# Patient Record
Sex: FEMALE | Race: WHITE | Employment: OTHER | URBAN - METROPOLITAN AREA
[De-identification: names, ages, dates, MRNs, and addresses within clinical notes are randomized per-mention and may not be internally consistent; named-entity substitution may affect disease eponyms.]

---

## 2019-03-11 ENCOUNTER — HOSPITAL ENCOUNTER (EMERGENCY)
Age: 74
Discharge: HOME | DRG: 552 | End: 2019-03-11
Attending: EMERGENCY MEDICINE
Payer: MEDICARE

## 2019-03-11 ENCOUNTER — APPOINTMENT (OUTPATIENT)
Dept: GENERAL RADIOLOGY | Age: 74
DRG: 552 | End: 2019-03-11
Payer: MEDICARE

## 2019-03-11 VITALS
HEIGHT: 60 IN | HEART RATE: 79 BPM | WEIGHT: 119 LBS | TEMPERATURE: 97.9 F | DIASTOLIC BLOOD PRESSURE: 97 MMHG | SYSTOLIC BLOOD PRESSURE: 247 MMHG | BODY MASS INDEX: 23.36 KG/M2 | OXYGEN SATURATION: 97 % | RESPIRATION RATE: 16 BRPM
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
## 2019-03-11 PROCEDURE — 72100 X-RAY EXAM L-S SPINE 2/3 VWS: CPT

## 2019-03-11 PROCEDURE — 99284 EMERGENCY DEPT VISIT MOD MDM: CPT

## 2019-03-11 RX ORDER — ACETAMINOPHEN 325 MG/1
650 TABLET ORAL ONCE
Status: DISCONTINUED | OUTPATIENT
Start: 2019-03-11 | End: 2019-03-11 | Stop reason: HOSPADM

## 2019-03-11 SDOH — HEALTH STABILITY: MENTAL HEALTH: HOW OFTEN DO YOU HAVE A DRINK CONTAINING ALCOHOL?: NEVER

## 2019-03-11 ASSESSMENT — ENCOUNTER SYMPTOMS
VOMITING: 0
WHEEZING: 0
COUGH: 0
ABDOMINAL PAIN: 0
BACK PAIN: 1
DIARRHEA: 0
NAUSEA: 0
EYE PAIN: 0
SHORTNESS OF BREATH: 0

## 2019-03-11 ASSESSMENT — PAIN SCALES - GENERAL: PAINLEVEL_OUTOF10: 8

## 2019-03-11 ASSESSMENT — PAIN DESCRIPTION - DESCRIPTORS: DESCRIPTORS: ACHING

## 2019-03-11 ASSESSMENT — PAIN DESCRIPTION - LOCATION: LOCATION: BUTTOCKS

## 2019-03-13 ENCOUNTER — HOSPITAL ENCOUNTER (INPATIENT)
Age: 74
LOS: 1 days | Discharge: HOME HEALTH | DRG: 552 | End: 2019-03-14
Attending: EMERGENCY MEDICINE | Admitting: INTERNAL MEDICINE
Payer: MEDICARE

## 2019-03-13 ENCOUNTER — APPOINTMENT (OUTPATIENT)
Dept: GENERAL RADIOLOGY | Age: 74
DRG: 552 | End: 2019-03-13
Payer: MEDICARE
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
## 2019-03-13 DIAGNOSIS — E86.0 DEHYDRATION: ICD-10-CM

## 2019-03-13 DIAGNOSIS — M54.9 INTRACTABLE BACK PAIN: ICD-10-CM

## 2019-03-13 LAB
ALBUMIN SERPL-MCNC: 3.9 G/DL (ref 3.4–5)
ALP BLD-CCNC: 96 U/L (ref 40–129)
ALT SERPL-CCNC: 7 U/L (ref 10–40)
ANION GAP SERPL CALCULATED.3IONS-SCNC: 24 MMOL/L (ref 3–16)
AST SERPL-CCNC: 22 U/L (ref 15–37)
BASOPHILS ABSOLUTE: 0 K/UL (ref 0–0.2)
BASOPHILS RELATIVE PERCENT: 0.4 %
BILIRUB SERPL-MCNC: 0.7 MG/DL (ref 0–1)
BILIRUBIN DIRECT: <0.2 MG/DL (ref 0–0.3)
BILIRUBIN URINE: ABNORMAL
BILIRUBIN, INDIRECT: ABNORMAL MG/DL (ref 0–1)
BLOOD, URINE: NEGATIVE
BUN BLDV-MCNC: 22 MG/DL (ref 7–20)
CALCIUM SERPL-MCNC: 9.1 MG/DL (ref 8.3–10.6)
CASTS: ABNORMAL /LPF
CHLORIDE BLD-SCNC: 98 MMOL/L (ref 99–110)
CLARITY: CLEAR
CO2: 18 MMOL/L (ref 21–32)
COLOR: YELLOW
CREAT SERPL-MCNC: 0.8 MG/DL (ref 0.6–1.2)
EKG ATRIAL RATE: 86 BPM
EKG DIAGNOSIS: NORMAL
EKG P AXIS: 43 DEGREES
EKG P-R INTERVAL: 140 MS
EKG Q-T INTERVAL: 386 MS
EKG QRS DURATION: 80 MS
EKG QTC CALCULATION (BAZETT): 461 MS
EKG R AXIS: -3 DEGREES
EKG T AXIS: 66 DEGREES
EKG VENTRICULAR RATE: 86 BPM
EOSINOPHILS ABSOLUTE: 0 K/UL (ref 0–0.6)
EOSINOPHILS RELATIVE PERCENT: 0.1 %
EPITHELIAL CELLS, UA: ABNORMAL /HPF
GFR AFRICAN AMERICAN: >60
GFR NON-AFRICAN AMERICAN: >60
GLUCOSE BLD-MCNC: 115 MG/DL (ref 70–99)
GLUCOSE URINE: NEGATIVE MG/DL
HCT VFR BLD CALC: 45.9 % (ref 36–48)
HEMOGLOBIN: 15.6 G/DL (ref 12–16)
KETONES, URINE: >=80 MG/DL
LACTIC ACID: 1.3 MMOL/L (ref 0.4–2)
LEUKOCYTE ESTERASE, URINE: NEGATIVE
LYMPHOCYTES ABSOLUTE: 0.3 K/UL (ref 1–5.1)
LYMPHOCYTES RELATIVE PERCENT: 4.9 %
MCH RBC QN AUTO: 30 PG (ref 26–34)
MCHC RBC AUTO-ENTMCNC: 34 G/DL (ref 31–36)
MCV RBC AUTO: 88.3 FL (ref 80–100)
MICROSCOPIC EXAMINATION: YES
MONOCYTES ABSOLUTE: 0.4 K/UL (ref 0–1.3)
MONOCYTES RELATIVE PERCENT: 5.8 %
MUCUS: ABNORMAL /LPF
NEUTROPHILS ABSOLUTE: 5.7 K/UL (ref 1.7–7.7)
NEUTROPHILS RELATIVE PERCENT: 88.8 %
NITRITE, URINE: NEGATIVE
PDW BLD-RTO: 13.7 % (ref 12.4–15.4)
PH UA: 6 (ref 5–8)
PLATELET # BLD: 224 K/UL (ref 135–450)
PMV BLD AUTO: 8.5 FL (ref 5–10.5)
POTASSIUM SERPL-SCNC: 3.7 MMOL/L (ref 3.5–5.1)
PROTEIN UA: 100 MG/DL
RBC # BLD: 5.2 M/UL (ref 4–5.2)
RBC UA: ABNORMAL /HPF (ref 0–2)
SODIUM BLD-SCNC: 140 MMOL/L (ref 136–145)
SPECIFIC GRAVITY UA: >=1.03 (ref 1–1.03)
TOTAL PROTEIN: 6.7 G/DL (ref 6.4–8.2)
TROPONIN: <0.01 NG/ML
URINE TYPE: ABNORMAL
UROBILINOGEN, URINE: 0.2 E.U./DL
WBC # BLD: 6.4 K/UL (ref 4–11)
WBC UA: ABNORMAL /HPF (ref 0–5)

## 2019-03-13 PROCEDURE — 93005 ELECTROCARDIOGRAM TRACING: CPT | Performed by: NURSE PRACTITIONER

## 2019-03-13 PROCEDURE — 96374 THER/PROPH/DIAG INJ IV PUSH: CPT

## 2019-03-13 PROCEDURE — 85025 COMPLETE CBC W/AUTO DIFF WBC: CPT

## 2019-03-13 PROCEDURE — 80048 BASIC METABOLIC PNL TOTAL CA: CPT

## 2019-03-13 PROCEDURE — 84484 ASSAY OF TROPONIN QUANT: CPT

## 2019-03-13 PROCEDURE — 2580000003 HC RX 258: Performed by: NURSE PRACTITIONER

## 2019-03-13 PROCEDURE — 96361 HYDRATE IV INFUSION ADD-ON: CPT

## 2019-03-13 PROCEDURE — 96365 THER/PROPH/DIAG IV INF INIT: CPT

## 2019-03-13 PROCEDURE — 83605 ASSAY OF LACTIC ACID: CPT

## 2019-03-13 PROCEDURE — 1200000000 HC SEMI PRIVATE

## 2019-03-13 PROCEDURE — 99285 EMERGENCY DEPT VISIT HI MDM: CPT

## 2019-03-13 PROCEDURE — 96375 TX/PRO/DX INJ NEW DRUG ADDON: CPT

## 2019-03-13 PROCEDURE — 6360000002 HC RX W HCPCS: Performed by: INTERNAL MEDICINE

## 2019-03-13 PROCEDURE — 81001 URINALYSIS AUTO W/SCOPE: CPT

## 2019-03-13 PROCEDURE — 2580000003 HC RX 258: Performed by: INTERNAL MEDICINE

## 2019-03-13 PROCEDURE — 80076 HEPATIC FUNCTION PANEL: CPT

## 2019-03-13 PROCEDURE — 73502 X-RAY EXAM HIP UNI 2-3 VIEWS: CPT

## 2019-03-13 PROCEDURE — 96372 THER/PROPH/DIAG INJ SC/IM: CPT

## 2019-03-13 PROCEDURE — 6370000000 HC RX 637 (ALT 250 FOR IP): Performed by: NURSE PRACTITIONER

## 2019-03-13 PROCEDURE — 6360000002 HC RX W HCPCS: Performed by: NURSE PRACTITIONER

## 2019-03-13 PROCEDURE — 6370000000 HC RX 637 (ALT 250 FOR IP): Performed by: INTERNAL MEDICINE

## 2019-03-13 PROCEDURE — 51798 US URINE CAPACITY MEASURE: CPT

## 2019-03-13 PROCEDURE — 36415 COLL VENOUS BLD VENIPUNCTURE: CPT

## 2019-03-13 RX ORDER — 0.9 % SODIUM CHLORIDE 0.9 %
500 INTRAVENOUS SOLUTION INTRAVENOUS ONCE
Status: COMPLETED | OUTPATIENT
Start: 2019-03-13 | End: 2019-03-13

## 2019-03-13 RX ORDER — SODIUM CHLORIDE 0.9 % (FLUSH) 0.9 %
10 SYRINGE (ML) INJECTION EVERY 12 HOURS SCHEDULED
Status: DISCONTINUED | OUTPATIENT
Start: 2019-03-13 | End: 2019-03-14 | Stop reason: HOSPADM

## 2019-03-13 RX ORDER — KETOROLAC TROMETHAMINE 30 MG/ML
15 INJECTION, SOLUTION INTRAMUSCULAR; INTRAVENOUS ONCE
Status: COMPLETED | OUTPATIENT
Start: 2019-03-13 | End: 2019-03-13

## 2019-03-13 RX ORDER — VITAMIN B COMPLEX
1 CAPSULE ORAL DAILY
COMMUNITY

## 2019-03-13 RX ORDER — DIAZEPAM 5 MG/1
5 TABLET ORAL ONCE
Status: DISCONTINUED | OUTPATIENT
Start: 2019-03-13 | End: 2019-03-13

## 2019-03-13 RX ORDER — ONDANSETRON 2 MG/ML
4 INJECTION INTRAMUSCULAR; INTRAVENOUS
Status: COMPLETED | OUTPATIENT
Start: 2019-03-13 | End: 2019-03-13

## 2019-03-13 RX ORDER — DEXTROSE AND SODIUM CHLORIDE 5; .9 G/100ML; G/100ML
1000 INJECTION, SOLUTION INTRAVENOUS CONTINUOUS
Status: DISCONTINUED | OUTPATIENT
Start: 2019-03-13 | End: 2019-03-14 | Stop reason: HOSPADM

## 2019-03-13 RX ORDER — ONDANSETRON 2 MG/ML
4 INJECTION INTRAMUSCULAR; INTRAVENOUS EVERY 6 HOURS PRN
Status: DISCONTINUED | OUTPATIENT
Start: 2019-03-13 | End: 2019-03-14 | Stop reason: HOSPADM

## 2019-03-13 RX ORDER — SODIUM CHLORIDE 0.9 % (FLUSH) 0.9 %
10 SYRINGE (ML) INJECTION PRN
Status: DISCONTINUED | OUTPATIENT
Start: 2019-03-13 | End: 2019-03-14 | Stop reason: HOSPADM

## 2019-03-13 RX ORDER — PREDNISONE 20 MG/1
40 TABLET ORAL ONCE
Status: COMPLETED | OUTPATIENT
Start: 2019-03-14 | End: 2019-03-14

## 2019-03-13 RX ORDER — AMLODIPINE BESYLATE 10 MG/1
10 TABLET ORAL DAILY
Status: DISCONTINUED | OUTPATIENT
Start: 2019-03-14 | End: 2019-03-14 | Stop reason: HOSPADM

## 2019-03-13 RX ORDER — OXYCODONE HYDROCHLORIDE AND ACETAMINOPHEN 5; 325 MG/1; MG/1
1 TABLET ORAL EVERY 4 HOURS PRN
Status: DISCONTINUED | OUTPATIENT
Start: 2019-03-13 | End: 2019-03-14 | Stop reason: HOSPADM

## 2019-03-13 RX ORDER — AMLODIPINE BESYLATE 5 MG/1
5 TABLET ORAL DAILY
Status: ON HOLD | COMMUNITY
End: 2019-03-14 | Stop reason: SDUPTHER

## 2019-03-13 RX ORDER — AMLODIPINE BESYLATE 5 MG/1
5 TABLET ORAL ONCE
Status: COMPLETED | OUTPATIENT
Start: 2019-03-13 | End: 2019-03-13

## 2019-03-13 RX ORDER — HYDRALAZINE HYDROCHLORIDE 10 MG/1
10 TABLET, FILM COATED ORAL EVERY 8 HOURS SCHEDULED
Status: DISCONTINUED | OUTPATIENT
Start: 2019-03-13 | End: 2019-03-14 | Stop reason: HOSPADM

## 2019-03-13 RX ORDER — OXYCODONE HYDROCHLORIDE AND ACETAMINOPHEN 5; 325 MG/1; MG/1
2 TABLET ORAL EVERY 4 HOURS PRN
Status: DISCONTINUED | OUTPATIENT
Start: 2019-03-13 | End: 2019-03-14 | Stop reason: HOSPADM

## 2019-03-13 RX ORDER — HYDRALAZINE HYDROCHLORIDE 20 MG/ML
10 INJECTION INTRAMUSCULAR; INTRAVENOUS EVERY 6 HOURS PRN
Status: DISCONTINUED | OUTPATIENT
Start: 2019-03-13 | End: 2019-03-14 | Stop reason: HOSPADM

## 2019-03-13 RX ORDER — MULTIVIT-MIN/IRON/FOLIC ACID/K 18-600-40
2000 CAPSULE ORAL DAILY
COMMUNITY

## 2019-03-13 RX ORDER — RISPERIDONE 0.25 MG/1
0.25 TABLET, FILM COATED ORAL DAILY
Status: ON HOLD | COMMUNITY
End: 2019-03-16

## 2019-03-13 RX ORDER — DIAZEPAM 5 MG/1
5 TABLET ORAL ONCE
Status: COMPLETED | OUTPATIENT
Start: 2019-03-13 | End: 2019-03-13

## 2019-03-13 RX ORDER — PREDNISONE 20 MG/1
40 TABLET ORAL ONCE
Status: COMPLETED | OUTPATIENT
Start: 2019-03-13 | End: 2019-03-13

## 2019-03-13 RX ORDER — AMLODIPINE BESYLATE 5 MG/1
5 TABLET ORAL DAILY
Status: DISCONTINUED | OUTPATIENT
Start: 2019-03-13 | End: 2019-03-13

## 2019-03-13 RX ADMIN — HYDRALAZINE HYDROCHLORIDE 10 MG: 10 TABLET, FILM COATED ORAL at 15:47

## 2019-03-13 RX ADMIN — AMLODIPINE BESYLATE 5 MG: 5 TABLET ORAL at 17:51

## 2019-03-13 RX ADMIN — KETOROLAC TROMETHAMINE 15 MG: 30 INJECTION, SOLUTION INTRAMUSCULAR; INTRAVENOUS at 15:28

## 2019-03-13 RX ADMIN — PREDNISONE 40 MG: 20 TABLET ORAL at 17:51

## 2019-03-13 RX ADMIN — HYDRALAZINE HYDROCHLORIDE 10 MG: 10 TABLET, FILM COATED ORAL at 20:41

## 2019-03-13 RX ADMIN — AMLODIPINE BESYLATE 5 MG: 5 TABLET ORAL at 15:47

## 2019-03-13 RX ADMIN — METHOCARBAMOL 500 MG: 100 INJECTION, SOLUTION INTRAMUSCULAR; INTRAVENOUS at 17:50

## 2019-03-13 RX ADMIN — DEXTROSE AND SODIUM CHLORIDE 1000 ML: 5; 900 INJECTION, SOLUTION INTRAVENOUS at 14:22

## 2019-03-13 RX ADMIN — SODIUM CHLORIDE 500 ML: 9 INJECTION, SOLUTION INTRAVENOUS at 11:32

## 2019-03-13 RX ADMIN — ONDANSETRON 4 MG: 2 INJECTION INTRAMUSCULAR; INTRAVENOUS at 11:32

## 2019-03-13 RX ADMIN — Medication 10 ML: at 17:52

## 2019-03-13 RX ADMIN — DIAZEPAM 5 MG: 5 TABLET ORAL at 11:32

## 2019-03-13 RX ADMIN — ENOXAPARIN SODIUM 40 MG: 40 INJECTION SUBCUTANEOUS at 17:52

## 2019-03-13 ASSESSMENT — ENCOUNTER SYMPTOMS
BACK PAIN: 1
GASTROINTESTINAL NEGATIVE: 1
RESPIRATORY NEGATIVE: 1

## 2019-03-13 ASSESSMENT — PAIN DESCRIPTION - PAIN TYPE: TYPE: ACUTE PAIN

## 2019-03-13 ASSESSMENT — PAIN SCALES - GENERAL
PAINLEVEL_OUTOF10: 4
PAINLEVEL_OUTOF10: 0
PAINLEVEL_OUTOF10: 0

## 2019-03-13 ASSESSMENT — PAIN DESCRIPTION - LOCATION: LOCATION: BACK

## 2019-03-13 ASSESSMENT — PAIN DESCRIPTION - ORIENTATION: ORIENTATION: LOWER

## 2019-03-14 ENCOUNTER — APPOINTMENT (OUTPATIENT)
Dept: GENERAL RADIOLOGY | Age: 74
DRG: 305 | End: 2019-03-14
Payer: MEDICARE

## 2019-03-14 ENCOUNTER — HOSPITAL ENCOUNTER (EMERGENCY)
Age: 74
Discharge: HOME | DRG: 305 | End: 2019-03-15
Attending: EMERGENCY MEDICINE
Payer: MEDICARE

## 2019-03-14 VITALS
SYSTOLIC BLOOD PRESSURE: 142 MMHG | HEART RATE: 119 BPM | DIASTOLIC BLOOD PRESSURE: 72 MMHG | TEMPERATURE: 97.9 F | OXYGEN SATURATION: 94 % | WEIGHT: 105.82 LBS | HEIGHT: 60 IN | BODY MASS INDEX: 20.78 KG/M2 | RESPIRATION RATE: 15 BRPM

## 2019-03-14 DIAGNOSIS — M25.552 BILATERAL HIP PAIN: Primary | ICD-10-CM

## 2019-03-14 DIAGNOSIS — M25.551 BILATERAL HIP PAIN: Primary | ICD-10-CM

## 2019-03-14 PROBLEM — I10 UNCONTROLLED HYPERTENSION: Status: ACTIVE | Noted: 2019-03-14

## 2019-03-14 PROBLEM — M54.50 LOWER BACK PAIN: Status: ACTIVE | Noted: 2019-03-14

## 2019-03-14 PROBLEM — Z91.14 NON COMPLIANCE W MEDICATION REGIMEN: Status: ACTIVE | Noted: 2019-03-14

## 2019-03-14 PROBLEM — E55.9 VITAMIN D DEFICIENCY: Status: ACTIVE | Noted: 2019-03-14

## 2019-03-14 PROBLEM — M54.9 ACUTE BACK PAIN: Status: ACTIVE | Noted: 2019-03-14

## 2019-03-14 PROBLEM — M62.830 PARASPINAL MUSCLE SPASM: Status: ACTIVE | Noted: 2019-03-14

## 2019-03-14 LAB
ANION GAP SERPL CALCULATED.3IONS-SCNC: 11 MMOL/L (ref 3–16)
BASOPHILS ABSOLUTE: 0 K/UL (ref 0–0.2)
BASOPHILS RELATIVE PERCENT: 0.2 %
BUN BLDV-MCNC: 17 MG/DL (ref 7–20)
CALCIUM SERPL-MCNC: 9 MG/DL (ref 8.3–10.6)
CHLORIDE BLD-SCNC: 106 MMOL/L (ref 99–110)
CO2: 22 MMOL/L (ref 21–32)
CREAT SERPL-MCNC: <0.5 MG/DL (ref 0.6–1.2)
EOSINOPHILS ABSOLUTE: 0 K/UL (ref 0–0.6)
EOSINOPHILS RELATIVE PERCENT: 0 %
GFR AFRICAN AMERICAN: >60
GFR NON-AFRICAN AMERICAN: >60
GLUCOSE BLD-MCNC: 175 MG/DL (ref 70–99)
HCT VFR BLD CALC: 40.7 % (ref 36–48)
HEMOGLOBIN: 13.6 G/DL (ref 12–16)
LYMPHOCYTES ABSOLUTE: 0.2 K/UL (ref 1–5.1)
LYMPHOCYTES RELATIVE PERCENT: 5.5 %
MCH RBC QN AUTO: 29.6 PG (ref 26–34)
MCHC RBC AUTO-ENTMCNC: 33.5 G/DL (ref 31–36)
MCV RBC AUTO: 88.3 FL (ref 80–100)
MONOCYTES ABSOLUTE: 0.3 K/UL (ref 0–1.3)
MONOCYTES RELATIVE PERCENT: 8.1 %
NEUTROPHILS ABSOLUTE: 3.6 K/UL (ref 1.7–7.7)
NEUTROPHILS RELATIVE PERCENT: 86.2 %
PDW BLD-RTO: 13.5 % (ref 12.4–15.4)
PLATELET # BLD: 184 K/UL (ref 135–450)
PMV BLD AUTO: 8.5 FL (ref 5–10.5)
POTASSIUM REFLEX MAGNESIUM: 3.7 MMOL/L (ref 3.5–5.1)
RBC # BLD: 4.61 M/UL (ref 4–5.2)
SODIUM BLD-SCNC: 139 MMOL/L (ref 136–145)
VITAMIN D 25-HYDROXY: 9.8 NG/ML
WBC # BLD: 4.2 K/UL (ref 4–11)

## 2019-03-14 PROCEDURE — 6370000000 HC RX 637 (ALT 250 FOR IP): Performed by: EMERGENCY MEDICINE

## 2019-03-14 PROCEDURE — 97530 THERAPEUTIC ACTIVITIES: CPT

## 2019-03-14 PROCEDURE — 6370000000 HC RX 637 (ALT 250 FOR IP): Performed by: INTERNAL MEDICINE

## 2019-03-14 PROCEDURE — 51798 US URINE CAPACITY MEASURE: CPT

## 2019-03-14 PROCEDURE — 96366 THER/PROPH/DIAG IV INF ADDON: CPT

## 2019-03-14 PROCEDURE — 36415 COLL VENOUS BLD VENIPUNCTURE: CPT

## 2019-03-14 PROCEDURE — 2580000003 HC RX 258: Performed by: INTERNAL MEDICINE

## 2019-03-14 PROCEDURE — 96376 TX/PRO/DX INJ SAME DRUG ADON: CPT

## 2019-03-14 PROCEDURE — 97162 PT EVAL MOD COMPLEX 30 MIN: CPT

## 2019-03-14 PROCEDURE — 6360000002 HC RX W HCPCS: Performed by: INTERNAL MEDICINE

## 2019-03-14 PROCEDURE — 82306 VITAMIN D 25 HYDROXY: CPT

## 2019-03-14 PROCEDURE — 80048 BASIC METABOLIC PNL TOTAL CA: CPT

## 2019-03-14 PROCEDURE — 99283 EMERGENCY DEPT VISIT LOW MDM: CPT

## 2019-03-14 PROCEDURE — 97535 SELF CARE MNGMENT TRAINING: CPT

## 2019-03-14 PROCEDURE — 97116 GAIT TRAINING THERAPY: CPT

## 2019-03-14 PROCEDURE — 97165 OT EVAL LOW COMPLEX 30 MIN: CPT

## 2019-03-14 PROCEDURE — 96375 TX/PRO/DX INJ NEW DRUG ADDON: CPT

## 2019-03-14 PROCEDURE — 73522 X-RAY EXAM HIPS BI 3-4 VIEWS: CPT

## 2019-03-14 PROCEDURE — 85025 COMPLETE CBC W/AUTO DIFF WBC: CPT

## 2019-03-14 RX ORDER — HYDRALAZINE HYDROCHLORIDE 10 MG/1
10 TABLET, FILM COATED ORAL EVERY 8 HOURS SCHEDULED
Qty: 90 TABLET | Refills: 3 | Status: SHIPPED | OUTPATIENT
Start: 2019-03-14 | End: 2019-03-14

## 2019-03-14 RX ORDER — AMLODIPINE BESYLATE 10 MG/1
10 TABLET ORAL DAILY
Qty: 30 TABLET | Refills: 3 | Status: SHIPPED | OUTPATIENT
Start: 2019-03-14

## 2019-03-14 RX ORDER — AMLODIPINE BESYLATE 10 MG/1
10 TABLET ORAL DAILY
Qty: 30 TABLET | Refills: 1 | Status: SHIPPED | OUTPATIENT
Start: 2019-03-14 | End: 2019-03-14 | Stop reason: SDUPTHER

## 2019-03-14 RX ORDER — ERGOCALCIFEROL 1.25 MG/1
50000 CAPSULE ORAL WEEKLY
Qty: 12 CAPSULE | Refills: 0 | Status: SHIPPED | OUTPATIENT
Start: 2019-03-14 | End: 2019-06-12

## 2019-03-14 RX ORDER — PREDNISONE 20 MG/1
20 TABLET ORAL 2 TIMES DAILY
Qty: 10 TABLET | Refills: 0 | Status: ON HOLD | OUTPATIENT
Start: 2019-03-14 | End: 2019-03-19 | Stop reason: HOSPADM

## 2019-03-14 RX ORDER — METHOCARBAMOL 500 MG/1
500 TABLET, FILM COATED ORAL 3 TIMES DAILY
Qty: 15 TABLET | Refills: 0 | Status: SHIPPED | OUTPATIENT
Start: 2019-03-14 | End: 2019-03-19

## 2019-03-14 RX ORDER — HYDRALAZINE HYDROCHLORIDE 10 MG/1
10 TABLET, FILM COATED ORAL EVERY 8 HOURS SCHEDULED
Qty: 90 TABLET | Refills: 3 | Status: ON HOLD | OUTPATIENT
Start: 2019-03-14 | End: 2019-03-19 | Stop reason: HOSPADM

## 2019-03-14 RX ORDER — ACETAMINOPHEN 500 MG
1000 TABLET ORAL ONCE
Status: COMPLETED | OUTPATIENT
Start: 2019-03-14 | End: 2019-03-14

## 2019-03-14 RX ORDER — METHOCARBAMOL 500 MG/1
500 TABLET, FILM COATED ORAL 3 TIMES DAILY
Qty: 15 TABLET | Refills: 0 | Status: SHIPPED | OUTPATIENT
Start: 2019-03-14 | End: 2019-03-14 | Stop reason: SDUPTHER

## 2019-03-14 RX ADMIN — HYDRALAZINE HYDROCHLORIDE 10 MG: 10 TABLET, FILM COATED ORAL at 05:51

## 2019-03-14 RX ADMIN — DEXTROSE AND SODIUM CHLORIDE 1000 ML: 5; 900 INJECTION, SOLUTION INTRAVENOUS at 08:09

## 2019-03-14 RX ADMIN — METHOCARBAMOL 500 MG: 100 INJECTION, SOLUTION INTRAMUSCULAR; INTRAVENOUS at 01:15

## 2019-03-14 RX ADMIN — PREDNISONE 40 MG: 20 TABLET ORAL at 08:37

## 2019-03-14 RX ADMIN — ENOXAPARIN SODIUM 40 MG: 40 INJECTION SUBCUTANEOUS at 08:37

## 2019-03-14 RX ADMIN — Medication 10 ML: at 08:38

## 2019-03-14 RX ADMIN — METHOCARBAMOL 500 MG: 100 INJECTION, SOLUTION INTRAMUSCULAR; INTRAVENOUS at 14:18

## 2019-03-14 RX ADMIN — ACETAMINOPHEN 1000 MG: 500 TABLET, FILM COATED ORAL at 23:31

## 2019-03-14 RX ADMIN — METHOCARBAMOL 500 MG: 100 INJECTION, SOLUTION INTRAMUSCULAR; INTRAVENOUS at 09:45

## 2019-03-14 RX ADMIN — HYDRALAZINE HYDROCHLORIDE 10 MG: 20 INJECTION INTRAMUSCULAR; INTRAVENOUS at 08:38

## 2019-03-14 RX ADMIN — AMLODIPINE BESYLATE 10 MG: 10 TABLET ORAL at 08:38

## 2019-03-14 RX ADMIN — Medication 10 ML: at 01:15

## 2019-03-14 RX ADMIN — HYDRALAZINE HYDROCHLORIDE 10 MG: 10 TABLET, FILM COATED ORAL at 14:18

## 2019-03-14 ASSESSMENT — PAIN DESCRIPTION - LOCATION: LOCATION: HIP

## 2019-03-14 ASSESSMENT — PAIN SCALES - GENERAL
PAINLEVEL_OUTOF10: 5
PAINLEVEL_OUTOF10: 5
PAINLEVEL_OUTOF10: 0

## 2019-03-14 ASSESSMENT — PAIN DESCRIPTION - DESCRIPTORS: DESCRIPTORS: ACHING

## 2019-03-14 ASSESSMENT — PAIN DESCRIPTION - ORIENTATION: ORIENTATION: LEFT

## 2019-03-14 ASSESSMENT — PAIN DESCRIPTION - PAIN TYPE: TYPE: ACUTE PAIN

## 2019-03-15 ENCOUNTER — APPOINTMENT (OUTPATIENT)
Dept: MRI IMAGING | Age: 74
DRG: 305 | End: 2019-03-15
Payer: MEDICARE

## 2019-03-15 ENCOUNTER — HOSPITAL ENCOUNTER (INPATIENT)
Age: 74
LOS: 3 days | Discharge: NURSING, REHAB OR LONG-TERM CARE | DRG: 305 | End: 2019-03-19
Attending: EMERGENCY MEDICINE | Admitting: INTERNAL MEDICINE
Payer: MEDICARE

## 2019-03-15 VITALS
HEART RATE: 84 BPM | RESPIRATION RATE: 16 BRPM | OXYGEN SATURATION: 99 % | TEMPERATURE: 98.2 F | WEIGHT: 105 LBS | SYSTOLIC BLOOD PRESSURE: 168 MMHG | BODY MASS INDEX: 20.51 KG/M2 | DIASTOLIC BLOOD PRESSURE: 68 MMHG

## 2019-03-15 DIAGNOSIS — R41.0 DELIRIUM: Primary | ICD-10-CM

## 2019-03-15 DIAGNOSIS — M54.50 ACUTE MIDLINE LOW BACK PAIN WITHOUT SCIATICA: ICD-10-CM

## 2019-03-15 LAB
ANION GAP SERPL CALCULATED.3IONS-SCNC: 13 MMOL/L (ref 3–16)
BACTERIA: ABNORMAL /HPF
BASOPHILS ABSOLUTE: 0 K/UL (ref 0–0.2)
BASOPHILS RELATIVE PERCENT: 0.8 %
BILIRUBIN URINE: NEGATIVE
BLOOD, URINE: ABNORMAL
BUN BLDV-MCNC: 12 MG/DL (ref 7–20)
CALCIUM SERPL-MCNC: 9.4 MG/DL (ref 8.3–10.6)
CHLORIDE BLD-SCNC: 99 MMOL/L (ref 99–110)
CLARITY: CLEAR
CO2: 29 MMOL/L (ref 21–32)
COLOR: YELLOW
CREAT SERPL-MCNC: 0.6 MG/DL (ref 0.6–1.2)
EOSINOPHILS ABSOLUTE: 0 K/UL (ref 0–0.6)
EOSINOPHILS RELATIVE PERCENT: 0.5 %
EPITHELIAL CELLS, UA: ABNORMAL /HPF
GFR AFRICAN AMERICAN: >60
GFR NON-AFRICAN AMERICAN: >60
GLUCOSE BLD-MCNC: 117 MG/DL (ref 70–99)
GLUCOSE URINE: NEGATIVE MG/DL
HCT VFR BLD CALC: 41.7 % (ref 36–48)
HEMOGLOBIN: 14.1 G/DL (ref 12–16)
KETONES, URINE: NEGATIVE MG/DL
LEUKOCYTE ESTERASE, URINE: NEGATIVE
LYMPHOCYTES ABSOLUTE: 0.7 K/UL (ref 1–5.1)
LYMPHOCYTES RELATIVE PERCENT: 11.5 %
MAGNESIUM: 1.8 MG/DL (ref 1.8–2.4)
MCH RBC QN AUTO: 29.7 PG (ref 26–34)
MCHC RBC AUTO-ENTMCNC: 33.9 G/DL (ref 31–36)
MCV RBC AUTO: 87.5 FL (ref 80–100)
MICROSCOPIC EXAMINATION: YES
MONOCYTES ABSOLUTE: 0.6 K/UL (ref 0–1.3)
MONOCYTES RELATIVE PERCENT: 8.9 %
NEUTROPHILS ABSOLUTE: 5 K/UL (ref 1.7–7.7)
NEUTROPHILS RELATIVE PERCENT: 78.3 %
NITRITE, URINE: NEGATIVE
PDW BLD-RTO: 14.3 % (ref 12.4–15.4)
PH UA: 7 (ref 5–8)
PLATELET # BLD: 248 K/UL (ref 135–450)
PMV BLD AUTO: 8.6 FL (ref 5–10.5)
POTASSIUM SERPL-SCNC: 3.2 MMOL/L (ref 3.5–5.1)
PROTEIN UA: NEGATIVE MG/DL
RBC # BLD: 4.77 M/UL (ref 4–5.2)
RBC UA: ABNORMAL /HPF (ref 0–2)
SODIUM BLD-SCNC: 141 MMOL/L (ref 136–145)
SPECIFIC GRAVITY UA: 1.01 (ref 1–1.03)
URINE REFLEX TO CULTURE: ABNORMAL
URINE TYPE: ABNORMAL
UROBILINOGEN, URINE: 0.2 E.U./DL
WBC # BLD: 6.4 K/UL (ref 4–11)
WBC UA: ABNORMAL /HPF (ref 0–5)

## 2019-03-15 PROCEDURE — 83735 ASSAY OF MAGNESIUM: CPT

## 2019-03-15 PROCEDURE — 96365 THER/PROPH/DIAG IV INF INIT: CPT

## 2019-03-15 PROCEDURE — 96375 TX/PRO/DX INJ NEW DRUG ADDON: CPT

## 2019-03-15 PROCEDURE — 81001 URINALYSIS AUTO W/SCOPE: CPT

## 2019-03-15 PROCEDURE — 72148 MRI LUMBAR SPINE W/O DYE: CPT

## 2019-03-15 PROCEDURE — 96361 HYDRATE IV INFUSION ADD-ON: CPT

## 2019-03-15 PROCEDURE — 6370000000 HC RX 637 (ALT 250 FOR IP): Performed by: EMERGENCY MEDICINE

## 2019-03-15 PROCEDURE — 85025 COMPLETE CBC W/AUTO DIFF WBC: CPT

## 2019-03-15 PROCEDURE — 80048 BASIC METABOLIC PNL TOTAL CA: CPT

## 2019-03-15 PROCEDURE — 6360000002 HC RX W HCPCS: Performed by: FAMILY MEDICINE

## 2019-03-15 PROCEDURE — 6360000002 HC RX W HCPCS: Performed by: EMERGENCY MEDICINE

## 2019-03-15 PROCEDURE — 99285 EMERGENCY DEPT VISIT HI MDM: CPT

## 2019-03-15 PROCEDURE — 36415 COLL VENOUS BLD VENIPUNCTURE: CPT

## 2019-03-15 PROCEDURE — 2580000003 HC RX 258: Performed by: EMERGENCY MEDICINE

## 2019-03-15 RX ORDER — HYDRALAZINE HYDROCHLORIDE 20 MG/ML
10 INJECTION INTRAMUSCULAR; INTRAVENOUS ONCE
Status: COMPLETED | OUTPATIENT
Start: 2019-03-15 | End: 2019-03-15

## 2019-03-15 RX ORDER — 0.9 % SODIUM CHLORIDE 0.9 %
1000 INTRAVENOUS SOLUTION INTRAVENOUS ONCE
Status: COMPLETED | OUTPATIENT
Start: 2019-03-15 | End: 2019-03-15

## 2019-03-15 RX ORDER — HYDRALAZINE HYDROCHLORIDE 10 MG/1
10 TABLET, FILM COATED ORAL ONCE
Status: COMPLETED | OUTPATIENT
Start: 2019-03-15 | End: 2019-03-15

## 2019-03-15 RX ORDER — POTASSIUM CHLORIDE 1.5 G/1.77G
60 POWDER, FOR SOLUTION ORAL ONCE
Status: COMPLETED | OUTPATIENT
Start: 2019-03-15 | End: 2019-03-15

## 2019-03-15 RX ORDER — AMLODIPINE BESYLATE 10 MG/1
10 TABLET ORAL ONCE
Status: COMPLETED | OUTPATIENT
Start: 2019-03-15 | End: 2019-03-15

## 2019-03-15 RX ORDER — AMLODIPINE BESYLATE 10 MG/1
10 TABLET ORAL DAILY
Status: DISCONTINUED | OUTPATIENT
Start: 2019-03-16 | End: 2019-03-15

## 2019-03-15 RX ORDER — MAGNESIUM SULFATE 1 G/100ML
1 INJECTION INTRAVENOUS ONCE
Status: COMPLETED | OUTPATIENT
Start: 2019-03-15 | End: 2019-03-15

## 2019-03-15 RX ADMIN — SODIUM CHLORIDE 1000 ML: 0.9 INJECTION, SOLUTION INTRAVENOUS at 18:56

## 2019-03-15 RX ADMIN — HYDRALAZINE HYDROCHLORIDE 10 MG: 20 INJECTION, SOLUTION INTRAMUSCULAR; INTRAVENOUS at 22:43

## 2019-03-15 RX ADMIN — HYDRALAZINE HYDROCHLORIDE 10 MG: 10 TABLET, FILM COATED ORAL at 21:40

## 2019-03-15 RX ADMIN — POTASSIUM CHLORIDE 60 MEQ: 1.5 POWDER, FOR SOLUTION ORAL at 20:06

## 2019-03-15 RX ADMIN — AMLODIPINE BESYLATE 10 MG: 10 TABLET ORAL at 21:40

## 2019-03-15 RX ADMIN — MAGNESIUM SULFATE HEPTAHYDRATE 1 G: 1 INJECTION, SOLUTION INTRAVENOUS at 20:56

## 2019-03-15 ASSESSMENT — ENCOUNTER SYMPTOMS
GASTROINTESTINAL NEGATIVE: 1
COUGH: 0
RESPIRATORY NEGATIVE: 1
ABDOMINAL PAIN: 0
BACK PAIN: 1
RHINORRHEA: 0
DIARRHEA: 0
EYES NEGATIVE: 1
SHORTNESS OF BREATH: 0
NAUSEA: 0
BACK PAIN: 1
VOMITING: 0

## 2019-03-15 ASSESSMENT — PAIN DESCRIPTION - FREQUENCY: FREQUENCY: CONTINUOUS

## 2019-03-15 ASSESSMENT — PAIN DESCRIPTION - DESCRIPTORS: DESCRIPTORS: ACHING

## 2019-03-15 ASSESSMENT — PAIN DESCRIPTION - PAIN TYPE: TYPE: ACUTE PAIN

## 2019-03-15 ASSESSMENT — PAIN DESCRIPTION - ORIENTATION: ORIENTATION: LOWER

## 2019-03-15 ASSESSMENT — PAIN SCALES - GENERAL: PAINLEVEL_OUTOF10: 1

## 2019-03-15 NOTE — ED PROVIDER NOTES
[address] [facility]          ATTENDING PHYSICIAN NOTE       Date of evaluation: 3/14/2019    Chief Complaint     Hip Pain (left hip pain/denies any new falls)      History of Present Illness     Jer Bella is a 68 y.o. female who presents to the emergency department for hip pain. Patient had a fall approximately 4 days ago and was seen initially in the emergency department for back pain and discharged home but then returned because of inability to get out of the bed. She was admitted to the hospital and had evaluation by physical myofascial therapy and was cleared to go home with 24-hour supervision. Patient states she started having pain now in her hips and intsead of the pain in her back. She states she went fire department and they recommended she go to the hospital for evaluation. She states the pain is worse the left side but is present on both sides. She denies any new trauma. She has been taking the medications prescribed for her which include a muscle relaxant and steroids but has not tried any other medications for pain. Review of Systems     Review of Systems   Constitutional: Negative. HENT: Negative. Eyes: Negative. Respiratory: Negative. Cardiovascular: Negative. Gastrointestinal: Negative. Genitourinary: Negative. Musculoskeletal: Positive for back pain and myalgias. Neurological: Negative. All other systems reviewed and are negative. Past Medical, Surgical, Family, and Social History     She has a past medical history of Hyperlipidemia, Hypertension, Hypothyroidism, Osteoporosis, Patient noncompliance, Renal colic, and Vascular dementia. She has a past surgical history that includes  section. Her family history is not on file. She reports that she has never smoked. She has never used smokeless tobacco. She reports that she does not drink alcohol or use drugs.     Medications     Discharge Medication List as of 3/15/2019  1:15 AM      CONTINUE these medications which have NOT CHANGED    Details   vitamin D (ERGOCALCIFEROL) 89835 units CAPS capsule Take 1 capsule by mouth once a week, Disp-12 capsule, R-0Normal      hydrALAZINE (APRESOLINE) 10 MG tablet Take 1 tablet by mouth every 8 hours, Disp-90 tablet, R-3Normal      amLODIPine (NORVASC) 10 MG tablet Take 1 tablet by mouth daily, Disp-30 tablet, R-3Normal      methocarbamol (ROBAXIN) 500 MG tablet Take 1 tablet by mouth 3 times daily for 5 days, Disp-15 tablet, R-0Normal      predniSONE (DELTASONE) 20 MG tablet Take 1 tablet by mouth 2 times daily for 5 days, Disp-10 tablet, R-0Normal      risperiDONE (RISPERDAL) 0.25 MG tablet Take 0.25 mg by mouth dailyHistorical Med      Cholecalciferol (VITAMIN D) 2000 units CAPS capsule Take 2,000 capsules by mouth dailyHistorical Med      b complex vitamins capsule Take 1 capsule by mouth dailyHistorical Med      LACTOBACILLUS ACID-PECTIN PO Take by mouth No dose on bottleHistorical Med      NONFORMULARY Intolerance complex unsure doseHistorical Med             Allergies     She is allergic to codeine and zithromax [azithromycin]. Physical Exam     INITIAL VITALS: BP: (!) 185/84, Temp: 98.2 °F (36.8 °C), Pulse: 82, Resp: 16, SpO2: 96 %    Physical Exam   Constitutional: She appears well-developed and well-nourished. No distress. Cardiovascular: Intact distal pulses. Abdominal: Soft. There is no tenderness. Musculoskeletal:        Right hip: She exhibits tenderness and bony tenderness. Left hip: She exhibits tenderness and bony tenderness. Lumbar back: She exhibits tenderness. She exhibits no bony tenderness. Paraspinal tenderness to the lumbar spine with no midline tenderness or step is. Patient does have tenderness over the greater trochanters bilaterally with no tenderness on logroll.    Neurological:   Patient has decreased range of motion secondary pain but is able to fully range at her ankles bilaterally. Sensation is intact distally. Nursing note and vitals reviewed. Diagnostic Results     RADIOLOGY:  XR HIP 3-4 VW W PELVIS BILATERAL   Final Result     No acute fracture or dislocation. RECENT VITALS:  BP: (!) 168/68,Temp: 98.2 °F (36.8 °C), Pulse: 82, Resp: (!) 82, SpO2: 99 %     Procedures     N/A    ED Course     Nursing Notes, Past Medical Hx, Past Surgical Hx, Social Hx,Allergies, and Family Hx were reviewed. The patient was given the following medications:  Orders Placed This Encounter   Medications    acetaminophen (TYLENOL) tablet 1,000 mg    Misc. Devices (WALKER) MISC     Si each by Does not apply route daily     Dispense:  1 each     Refill:  0       CONSULTS:  None    MEDICAL DECISIONMAKING / ASSESSMENT / Connie Lynda is a 68 y.o. female presents to the emergency department complaining of hip pain. Patient had a fall several days ago and has been seen twice in the emergency department and once admitted to the hospital as been cleared by physical knot patient therapy. Patient has have tenderness to palpation to the bilateral hips though no tenderness on logroll. X-rays to the street no osseous abnormalities. Patient was given Tylenol with some improvement of her symptoms. Patient was able to ambulate using a walker. Family was very concerned of patient's ability to care for herself at home but had a long discussion stating that there is no clear indication for admission to the hospital.  Patient will be discharged with instructions to follow-up with her primary care provider. Clinical Impression     1. Bilateral hip pain        Disposition     PATIENT REFERRED TO:  Tanya Frankel MD            DISCHARGE MEDICATIONS:  Discharge Medication List as of 3/15/2019  1:15 AM      START taking these medications    Details   Misc.  Devices [facility]) MISC DAILY Starting Fri 3/15/2019, Disp-1 each, R-0, Print             DISPOSITION          Iver Barefoot

## 2019-03-15 NOTE — ED PROVIDER NOTES
[address] [facility]          ATTENDING PHYSICIAN NOTE     Date of evaluation: 3/15/2019    Chief Complaint     Back Pain      History of Present Illness     Maria Esther Gooden is a 68 y.o. female with a history of HTN, HL, and osteoporosis who presents with confusion, back pain, and urinary incontinence. Seen in ED 3/11 after fall. Seen again 3/13 and admitted. Discharged 3/14 and seen again in ED shortly thereafter. Increasingly confused today. Also with new urinary incontinence. Still having hard time with pain though states 1/10 on arrival here. Denies fever, chills, chest pain, dyspnea, abdominal pain, nausea, vomiting, BM changes, dysuria, groin numbness, or other complaints. Review of Systems     Review of Systems   Constitutional: Negative for chills and fever. HENT: Negative for congestion and rhinorrhea. Respiratory: Negative for cough and shortness of breath. Cardiovascular: Negative for chest pain and palpitations. Gastrointestinal: Negative for abdominal pain, diarrhea, nausea and vomiting. Genitourinary: Positive for frequency and urgency. Negative for dysuria. Musculoskeletal: Positive for back pain. Negative for neck pain. Skin: Negative for rash and wound. Neurological: Negative for syncope and headaches. Psychiatric/Behavioral: Positive for confusion. Negative for agitation. Past Medical, Surgical, Family, and Social History     She has a past medical history of Hyperlipidemia, Hypertension, Hypothyroidism, Osteoporosis, Patient noncompliance, Renal colic, and Vascular dementia. She has a past surgical history that includes  section. Her family history is not on file. She reports that she has never smoked. She has never used smokeless tobacco. She reports that she does not drink alcohol or use drugs.     Medications     Previous Medications    AMLODIPINE (NORVASC) 10 MG TABLET    Take 1 tablet by mouth daily    B COMPLEX VITAMINS CAPSULE    Take 1 capsule by mouth daily    CHOLECALCIFEROL (VITAMIN D) 2000 UNITS CAPS CAPSULE    Take 2,000 capsules by mouth daily    HYDRALAZINE (APRESOLINE) 10 MG TABLET    Take 1 tablet by mouth every 8 hours    LACTOBACILLUS ACID-PECTIN PO    Take by mouth No dose on bottle    METHOCARBAMOL (ROBAXIN) 500 MG TABLET    Take 1 tablet by mouth 3 times daily for 5 days    MISC. DEVICES (WALKER) MISC    1 each by Does not apply route daily    NONFORMULARY    Intolerance complex unsure dose    PREDNISONE (DELTASONE) 20 MG TABLET    Take 1 tablet by mouth 2 times daily for 5 days    RISPERIDONE (RISPERDAL) 0.25 MG TABLET    Take 0.25 mg by mouth daily    VITAMIN D (ERGOCALCIFEROL) 31325 UNITS CAPS CAPSULE    Take 1 capsule by mouth once a week       Allergies     Sheis allergic to codeine and zithromax [azithromycin]. Physical Exam     INITIAL VITALS: BP: (!) 192/86,Temp: 97.8 °F (36.6 °C), Pulse: 91, Resp: 18, SpO2: 96 %   Physical Exam   Constitutional: She is oriented to person, place, and time. She appears well-developed and well-nourished. No distress. HENT:   Head: Normocephalic and atraumatic. Eyes: EOM are normal. No scleral icterus. Neck: Normal range of motion. No tracheal deviation present. Cardiovascular: Normal rate, regular rhythm, normal heart sounds and intact distal pulses. Exam reveals no gallop and no friction rub. No murmur heard. Pulmonary/Chest: Effort normal and breath sounds normal. No respiratory distress. She has no wheezes. She has no rales. Abdominal: Soft. She exhibits no distension. There is no tenderness. There is no rebound and no guarding. Musculoskeletal: Normal range of motion. She exhibits no edema. No TTP   Neurological: She is alert and oriented to person, place, and time. No cranial nerve deficit. A&Ox3 but DTS and bCAM positive  No focal deficits noted   Skin: Skin is warm. No rash noted. She is not diaphoretic.    Psychiatric: She has a normal mood and affect. Her behavior is normal.   Nursing note and vitals reviewed. Diagnostic Results     EKG   As below    RADIOLOGY:  MRI LUMBAR SPINE WO CONTRAST   Final Result      1. Approximately 30% loss of height of the superior endplate of L2. There is no edema within the L2 vertebral body; therefore, this fracture is remote. 2. Hemangioma of the L3 vertebral body. 3.  Mild diffuse disc bulging L4-L5.                 LABS:   Results for orders placed or performed during the hospital encounter of 03/15/19   CBC Auto Differential   Result Value Ref Range    WBC 6.4 4.0 - 11.0 K/uL    RBC 4.77 4.00 - 5.20 M/uL    Hemoglobin 14.1 12.0 - 16.0 g/dL    Hematocrit 41.7 36.0 - 48.0 %    MCV 87.5 80.0 - 100.0 fL    MCH 29.7 26.0 - 34.0 pg    MCHC 33.9 31.0 - 36.0 g/dL    RDW 14.3 12.4 - 15.4 %    Platelets 754 526 - 266 K/uL    MPV 8.6 5.0 - 10.5 fL    Neutrophils % 78.3 %    Lymphocytes % 11.5 %    Monocytes % 8.9 %    Eosinophils % 0.5 %    Basophils % 0.8 %    Neutrophils # 5.0 1.7 - 7.7 K/uL    Lymphocytes # 0.7 (L) 1.0 - 5.1 K/uL    Monocytes # 0.6 0.0 - 1.3 K/uL    Eosinophils # 0.0 0.0 - 0.6 K/uL    Basophils # 0.0 0.0 - 0.2 K/uL   Basic Metabolic Panel   Result Value Ref Range    Sodium 141 136 - 145 mmol/L    Potassium 3.2 (L) 3.5 - 5.1 mmol/L    Chloride 99 99 - 110 mmol/L    CO2 29 21 - 32 mmol/L    Anion Gap 13 3 - 16    Glucose 117 (H) 70 - 99 mg/dL    BUN 12 7 - 20 mg/dL    CREATININE 0.6 0.6 - 1.2 mg/dL    GFR Non-African American >60 >60    GFR African American >60 >60    Calcium 9.4 8.3 - 10.6 mg/dL   Urinalysis Reflex to Culture   Result Value Ref Range    Color, UA Yellow Straw/Yellow    Clarity, UA Clear Clear    Glucose, Ur Negative Negative mg/dL    Bilirubin Urine Negative Negative    Ketones, Urine Negative Negative mg/dL    Specific Gravity, UA 1.010 1.005 - 1.030    Blood, Urine MODERATE (A) Negative    pH, UA 7.0 5.0 - 8.0    Protein, UA Negative Negative mg/dL    Urobilinogen, Urine 0.2 <2.0 E.U./dL    Nitrite, Urine Negative Negative    Leukocyte Esterase, Urine Negative Negative    Microscopic Examination YES     Urine Reflex to Culture Not Indicated     Urine Type Voided    Microscopic Urinalysis   Result Value Ref Range    WBC, UA 0-2 0 - 5 /HPF    RBC, UA 20-50 (A) 0 - 2 /HPF    Epi Cells 0-2 /HPF    Bacteria, UA Rare (A) /HPF   Magnesium   Result Value Ref Range    Magnesium 1.80 1.80 - 2.40 mg/dL       ED BEDSIDE ULTRASOUND:  N/A    RECENT VITALS:  BP: (!) 227/106, Temp: 97.8 °F (36.6 °C), Pulse: 86, Resp: 18, SpO2: 96 %     Procedures     N/A    MEDICAL DECISION MAKING / ASSESSMENT / PLAN     Thiago Horan is a 68 y.o. female with a history of HTN, HL, and osteoporosis who presents with confusion, back pain, and urinary incontinence. Symptoms and exam most consistent with delirium 2/2 recently started medications +/- dehydration. Other considerations include UTI. Given back pain and urinary symptoms will evaluate for cauda equina. Doubt ACS. Plan: ECG, labs as below, MRI Lspine, IVF, pain control PRN    ED Course     Nursing Notes, Past Medical Hx, Past Surgical Hx, Social Hx, Allergies, and Family Hx were reviewed. The patient was given the followingmedications:  Orders Placed This Encounter   Medications    0.9 % sodium chloride bolus    magnesium sulfate 1 g in dextrose 5% 100 mL IVPB    potassium chloride (KLOR-CON) packet 60 mEq    DISCONTD: amLODIPine (NORVASC) tablet 10 mg    hydrALAZINE (APRESOLINE) tablet 10 mg    amLODIPine (NORVASC) tablet 10 mg       CONSULTS:  IP CONSULT TO HOSPITALIST    ED Course as of Mar 15 2214   Fri Mar 15, 2019   1922 Will replete   Potassium(!): 3.2 [AZ]   1951 Not c/w UTI   Leukocyte Esterase, Urine: Negative [AZ]   1958 RBC, UA(!): 20-50 [AZ]   2056 1. Approximately 30% loss of height of the superior endplate of L2. There is no edema within the L2 vertebral body; therefore, this fracture is remote.   2. Hemangioma of the L3 vertebral body.  3.  Mild diffuse disc bulging L4-L5. MRI LUMBAR SPINE WO CONTRAST [AZ]   1225 Suspect delirium 2/2 medications. Admitted to medicine for further work-up and management.    [AZ]      ED Course User Index  [AZ] Ani Doe MD       Clinical Impression     1. Delirium    2. Acute midline low back pain without sciatica        Disposition     PATIENT REFERRED TO:  No follow-up provider specified.     DISCHARGEMEDICATIONS:  New Prescriptions    No medications on file       Salomon Tripp MD  03/15/19 [id number]

## 2019-03-16 PROBLEM — R33.9 URINARY RETENTION: Status: ACTIVE | Noted: 2019-03-16

## 2019-03-16 PROBLEM — I16.0 HYPERTENSIVE URGENCY: Status: ACTIVE | Noted: 2019-03-16

## 2019-03-16 LAB
BACTERIA: ABNORMAL /HPF
BILIRUBIN URINE: NEGATIVE
BLOOD, URINE: ABNORMAL
CLARITY: CLEAR
COLOR: YELLOW
EPITHELIAL CELLS, UA: ABNORMAL /HPF
GLUCOSE URINE: NEGATIVE MG/DL
KETONES, URINE: ABNORMAL MG/DL
LEUKOCYTE ESTERASE, URINE: NEGATIVE
MICROSCOPIC EXAMINATION: YES
NITRITE, URINE: POSITIVE
PH UA: 7 (ref 5–8)
PROTEIN UA: NEGATIVE MG/DL
RBC UA: ABNORMAL /HPF (ref 0–2)
SPECIFIC GRAVITY UA: 1.01 (ref 1–1.03)
URINE TYPE: ABNORMAL
UROBILINOGEN, URINE: 0.2 E.U./DL
WBC UA: ABNORMAL /HPF (ref 0–5)

## 2019-03-16 PROCEDURE — 6370000000 HC RX 637 (ALT 250 FOR IP): Performed by: INTERNAL MEDICINE

## 2019-03-16 PROCEDURE — 6370000000 HC RX 637 (ALT 250 FOR IP): Performed by: FAMILY MEDICINE

## 2019-03-16 PROCEDURE — 2580000003 HC RX 258: Performed by: FAMILY MEDICINE

## 2019-03-16 PROCEDURE — 51701 INSERT BLADDER CATHETER: CPT

## 2019-03-16 PROCEDURE — 81001 URINALYSIS AUTO W/SCOPE: CPT

## 2019-03-16 PROCEDURE — 6360000002 HC RX W HCPCS: Performed by: FAMILY MEDICINE

## 2019-03-16 PROCEDURE — 84443 ASSAY THYROID STIM HORMONE: CPT

## 2019-03-16 PROCEDURE — 36415 COLL VENOUS BLD VENIPUNCTURE: CPT

## 2019-03-16 PROCEDURE — 51798 US URINE CAPACITY MEASURE: CPT

## 2019-03-16 PROCEDURE — 1200000000 HC SEMI PRIVATE

## 2019-03-16 RX ORDER — RISPERIDONE 0.25 MG/1
0.25 TABLET, FILM COATED ORAL DAILY
Status: DISCONTINUED | OUTPATIENT
Start: 2019-03-16 | End: 2019-03-16

## 2019-03-16 RX ORDER — SODIUM CHLORIDE 0.9 % (FLUSH) 0.9 %
10 SYRINGE (ML) INJECTION PRN
Status: DISCONTINUED | OUTPATIENT
Start: 2019-03-16 | End: 2019-03-19 | Stop reason: HOSPADM

## 2019-03-16 RX ORDER — AMLODIPINE BESYLATE 10 MG/1
10 TABLET ORAL DAILY
Status: DISCONTINUED | OUTPATIENT
Start: 2019-03-16 | End: 2019-03-19 | Stop reason: HOSPADM

## 2019-03-16 RX ORDER — ONDANSETRON 2 MG/ML
4 INJECTION INTRAMUSCULAR; INTRAVENOUS EVERY 8 HOURS PRN
Status: DISCONTINUED | OUTPATIENT
Start: 2019-03-16 | End: 2019-03-19 | Stop reason: HOSPADM

## 2019-03-16 RX ORDER — SODIUM CHLORIDE 0.9 % (FLUSH) 0.9 %
10 SYRINGE (ML) INJECTION EVERY 12 HOURS SCHEDULED
Status: DISCONTINUED | OUTPATIENT
Start: 2019-03-16 | End: 2019-03-19 | Stop reason: HOSPADM

## 2019-03-16 RX ORDER — VITAMIN B COMPLEX
1 CAPSULE ORAL DAILY
Status: DISCONTINUED | OUTPATIENT
Start: 2019-03-16 | End: 2019-03-19 | Stop reason: HOSPADM

## 2019-03-16 RX ORDER — HYDRALAZINE HYDROCHLORIDE 10 MG/1
10 TABLET, FILM COATED ORAL EVERY 8 HOURS SCHEDULED
Status: DISCONTINUED | OUTPATIENT
Start: 2019-03-16 | End: 2019-03-17

## 2019-03-16 RX ORDER — RISPERIDONE 0.25 MG/1
0.25 TABLET, FILM COATED ORAL DAILY
Status: DISCONTINUED | OUTPATIENT
Start: 2019-03-16 | End: 2019-03-17

## 2019-03-16 RX ORDER — TAMSULOSIN HYDROCHLORIDE 0.4 MG/1
0.4 CAPSULE ORAL DAILY
Status: DISCONTINUED | OUTPATIENT
Start: 2019-03-16 | End: 2019-03-19 | Stop reason: HOSPADM

## 2019-03-16 RX ORDER — LIDOCAINE 4 G/G
1 PATCH TOPICAL DAILY
Status: DISCONTINUED | OUTPATIENT
Start: 2019-03-16 | End: 2019-03-19 | Stop reason: HOSPADM

## 2019-03-16 RX ORDER — ACETAMINOPHEN 325 MG/1
650 TABLET ORAL EVERY 4 HOURS PRN
Status: DISCONTINUED | OUTPATIENT
Start: 2019-03-16 | End: 2019-03-19 | Stop reason: HOSPADM

## 2019-03-16 RX ADMIN — RISPERIDONE 0.25 MG: 0.25 TABLET ORAL at 16:31

## 2019-03-16 RX ADMIN — HYDRALAZINE HYDROCHLORIDE 10 MG: 10 TABLET, FILM COATED ORAL at 05:39

## 2019-03-16 RX ADMIN — HYDRALAZINE HYDROCHLORIDE 10 MG: 10 TABLET, FILM COATED ORAL at 23:37

## 2019-03-16 RX ADMIN — VITAMIN D, TAB 1000IU (100/BT) 2000 UNITS: 25 TAB at 08:39

## 2019-03-16 RX ADMIN — CEFTRIAXONE 1 G: 1 INJECTION, POWDER, FOR SOLUTION INTRAMUSCULAR; INTRAVENOUS at 05:40

## 2019-03-16 RX ADMIN — Medication 10 ML: at 08:35

## 2019-03-16 RX ADMIN — HYDRALAZINE HYDROCHLORIDE 10 MG: 10 TABLET, FILM COATED ORAL at 16:31

## 2019-03-16 RX ADMIN — TAMSULOSIN HYDROCHLORIDE 0.4 MG: 0.4 CAPSULE ORAL at 05:39

## 2019-03-16 RX ADMIN — Medication 1 CAPSULE: at 08:39

## 2019-03-16 RX ADMIN — Medication 10 ML: at 23:37

## 2019-03-16 RX ADMIN — ENOXAPARIN SODIUM 40 MG: 40 INJECTION SUBCUTANEOUS at 08:39

## 2019-03-16 ASSESSMENT — PAIN DESCRIPTION - ORIENTATION: ORIENTATION: LOWER

## 2019-03-16 ASSESSMENT — PAIN DESCRIPTION - PAIN TYPE: TYPE: ACUTE PAIN

## 2019-03-16 ASSESSMENT — PAIN DESCRIPTION - FREQUENCY: FREQUENCY: CONTINUOUS

## 2019-03-16 ASSESSMENT — PAIN DESCRIPTION - ONSET: ONSET: ON-GOING

## 2019-03-16 ASSESSMENT — PAIN SCALES - GENERAL
PAINLEVEL_OUTOF10: 0
PAINLEVEL_OUTOF10: 0
PAINLEVEL_OUTOF10: 7
PAINLEVEL_OUTOF10: 0

## 2019-03-16 ASSESSMENT — PAIN DESCRIPTION - LOCATION: LOCATION: BACK

## 2019-03-16 ASSESSMENT — PAIN DESCRIPTION - DESCRIPTORS: DESCRIPTORS: ACHING

## 2019-03-16 NOTE — PROGRESS NOTES
Pt was incontinent of small amount of urine in brief. Pt changed and repositioned for comfort. Bladder scan showed 134 ml in bladder post void. Will continue to assess.

## 2019-03-16 NOTE — ED NOTES
Patient's family inquiring about how much longer it would take for patient to get a bed, as they \"are getting tired and were hoping it would be soon. \"     Hoda Hough RN  03/15/19 1941

## 2019-03-16 NOTE — H&P
[facility]    3/16/2019 1:37 AM    Patient Information:  Juan Smith is a 68 y.o. female [id number]  PCP:  Jomar Santoyo MD (Tel: None )    Chief complaint:    Chief Complaint   Patient presents with    Back Pain        History of Present Illness:  Thiago Horan is a 68 y.o. female who presents with trouble getting up at home due to back pain. She was discharged from [facility] the day prior. She was admitted for back pain and uncontrolled hypertension at that time. She was started on Robaxin and steroids but they did not get the Rx filled. She is supposed to be taking norvasc and hydralazine at home but she won't take them because her son thinks the side effects are too scary. She has had to urinate frequently and unable to control her urine. She was noted to have 999 mL in her bladder after she tried to void and was cath'd for over 1000 mL. Her blood pressure came down after her bladder was emptied. She is not confused above her baseline per her . No fevers, no vomiting. REVIEW OF SYSTEMS:   Constitutional: Negative for fever,chills or night sweats  ENT: Negative for rhinorrhea, epistaxis, hoarseness, sore throat. Respiratory: Negative for shortness of breath,wheezing  Cardiovascular: Negative for chest pain, palpitations   Gastrointestinal: Negative for nausea, vomiting, diarrhea  Genitourinary: Negative for polyuria, dysuria  +frequency, dribbling. Hematologic/Lymphatic: Negative for bleeding tendency, easy bruising  Musculoskeletal: + for myalgias and arthralgias in her back  Neurologic: Negative for confusion,dysarthria. Skin: Negative for itching,rash  Psychiatric: Negative for depression,anxiety, agitation. Endocrine: Negative for polydipsia,polyuria,heat /cold intolerance.     Past Medical History:   has a past medical history of Hyperlipidemia, Hypertension, Hypothyroidism, Osteoporosis, Patient noncompliance, Renal colic, and Vascular dementia. Past Surgical History:   has a past surgical history that includes  section. Medications:  No current facility-administered medications on file prior to encounter. Current Outpatient Medications on File Prior to Encounter   Medication Sig Dispense Refill    Misc. Devices (WALKER) MISC 1 each by Does not apply route daily 1 each 0    vitamin D (ERGOCALCIFEROL) 86881 units CAPS capsule Take 1 capsule by mouth once a week 12 capsule 0    hydrALAZINE (APRESOLINE) 10 MG tablet Take 1 tablet by mouth every 8 hours 90 tablet 3    amLODIPine (NORVASC) 10 MG tablet Take 1 tablet by mouth daily 30 tablet 3    methocarbamol (ROBAXIN) 500 MG tablet Take 1 tablet by mouth 3 times daily for 5 days 15 tablet 0    predniSONE (DELTASONE) 20 MG tablet Take 1 tablet by mouth 2 times daily for 5 days 10 tablet 0    risperiDONE (RISPERDAL) 0.25 MG tablet Take 0.25 mg by mouth daily      Cholecalciferol (VITAMIN D) 2000 units CAPS capsule Take 2,000 capsules by mouth daily      b complex vitamins capsule Take 1 capsule by mouth daily      LACTOBACILLUS ACID-PECTIN PO Take by mouth No dose on bottle      NONFORMULARY Intolerance complex unsure dose         Allergies: Allergies   Allergen Reactions    Codeine      \"Severe vomiting\"    Zithromax [Azithromycin] Other (See Comments)     Increased heart rate         Social History:   reports that she has never smoked. She has never used smokeless tobacco. She reports that she does not drink alcohol or use drugs. Family History:  +HTN    Physical Exam:  BP (!) 198/88   Pulse 96   Temp 98.7 °F (37.1 °C) (Oral)   Resp 16   Ht 4' 11.84\" (1.52 m)   Wt 112 lb (50.8 kg)   SpO2 95%   Breastfeeding? No   BMI 21.99 kg/m²     General appearance:  Appears comfortable.  Well nourished  Eyes: conjunctiva clear, sclera anicteric  ENT: Moist mucus membranes  Neck:  Supple, no masses  Cardiovascular: Regular rhythm, normal S1, S2. No murmur, gallop, rub. No edema in lower extremities  Respiratory: Clear to auscultation bilaterally, no wheeze, good inspiratory effort  Gastrointestinal: Abdomen soft, non-tender, not distended, normal bowel sounds  Musculoskeletal: strength symmetric  Neurology: awake and alert; no facial asymmetry, CN 2-12 appear intact  No speech or motor deficits  Psychiatry: Appropriate affect. Not agitated, cooperative  Skin: Warm, dry, no rash    Labs:  CBC:   Lab Results   Component Value Date    WBC 6.4 03/15/2019    RBC 4.77 03/15/2019    HGB 14.1 03/15/2019    HCT 41.7 03/15/2019    MCV 87.5 03/15/2019    MCH 29.7 03/15/2019    MCHC 33.9 03/15/2019    RDW 14.3 03/15/2019     03/15/2019    MPV 8.6 03/15/2019     BMP:    Lab Results   Component Value Date     03/15/2019    K 3.2 03/15/2019    K 3.7 03/14/2019    CL 99 03/15/2019    CO2 29 03/15/2019    BUN 12 03/15/2019    CREATININE 0.6 03/15/2019    CALCIUM 9.4 03/15/2019    GFRAA >60 03/15/2019    LABGLOM >60 03/15/2019    GLUCOSE 117 03/15/2019     UA mod blood +nitrites neg LE    Imaging:   MRI back  1. Approximately 30% loss of height of the superior endplate of L2. There is no edema within the L2 vertebral body; therefore, this fracture is remote. 2. Hemangioma of the L3 vertebral body. 3.  Mild diffuse disc bulging L4-L5. Assessment/Plan:   Principal Problem:    Uncontrolled hypertension  Active Problems:    Urinary retention    Acute back pain    Non compliance w medication regimen    Hypertensive urgency  Resolved Problems:    * No resolved hospital problems. *    Patient is not taking her prescribed BP meds. Her BP did improve significantly after she was straight cath'd for over 1000 mL. Will add flomax. Will start back her Norvasc and apresoline. Will have PT and OT see her again.   Last admission family and patient refused SNF but they say they cannot take care of her at home.    Of note, patient sees Dr. Ronna Tee.  requested Dr. Ronna Tee admit the patient but his covering physician declined. Admit as inpatient. I anticipate hospitalization spanning more than two midnights for investigation and treatment of the above medically necessary diagnoses.       Darius Mullins MD    3/16/2019 1:37 AM

## 2019-03-16 NOTE — ED NOTES
Pts spouse in nursing station angry with nursing staff, states \"someone dropped the ball on this\" RN explains that she will find out answers for him. After speaking with Dr. Isak Alexanedr and Dr. Jacqueline Allen pt to be admitted under hospitalist coverage and was waiting to see if pt needed ICU coverage for BP management prior to writing admission. Pts son updated with poc.       Zuri Martinez RN  03/16/19 0030

## 2019-03-16 NOTE — PLAN OF CARE
Problem: Falls - Risk of:  Goal: Will remain free from falls  Description  Will remain free from falls  Outcome: Met This Shift  Note:   Pt makes no attempts to get out of bed unassisted. Bed alarm on and audible. Bedside table, call light and needs within reach. Will continue to round on pt for safety/needs. Problem: Pain:  Goal: Pain level will decrease  Description  Pain level will decrease  Outcome: Ongoing  Note:   Pt has pain with repositioning. Lidoderm patch to back. Pt repositioned with pillow support to relieve pain. Pt resting comfortably in bed. Will continue to assess pt for discomfort. Problem: Risk for Impaired Skin Integrity  Goal: Tissue integrity - skin and mucous membranes  Description  Structural intactness and normal physiological function of skin and  mucous membranes. Outcome: Ongoing  Note:   Pt has scattered bruising. Heels elevated off of bed on pillows. Pt changed for incontinence and barrier cream applied. Will continue to assess skin and intervene to avoid skin breakdown.

## 2019-03-16 NOTE — PLAN OF CARE
Advanced Care Planning Note. Purpose of Encounter: Advanced care planning in light of acute and chronic deteriorating medical conditions. Parties In Attendance: Patient Kash Turner)Andry ([facility]) and Son,  Boni Lawrence MD  (myself)    Decisional Capacity: No. Pt with dementia    Subjective: Patient/family understand in this voluntary conversation that Kash Turner continues to deteriorate and discuss what inteventions and plans patient would want implemented in light of the following diagnoses:   UTI  HTN  Dementia    Objective: Pt has been having chronic decline in functional status with increased dependence on others for ADLs  Increased frequency of Hospitalizations. Likelihood of further hospitalizations with likelihood of escalation of medical interventions with the expectation of returning to a diminishing baseline level of functionality. Discussion highlights: I discussed with the family ramifications of their acute and chronic medical problems- and the likely outcomes expected, both in terms of statistics, and as part of my experience seeing patients with similar conditions. Goals of Care Determination:  Patient is Full code for now, but has interest in continuing this conversation, and discussing with family before making any changes to code status and goals of care     Plan: Continue to educate patient on medical conditions and the choices available regarding possible outcomes with regard to goals of care and code status.        Time spent on Advanced care Plannin minutes    Boni Lawrence MD  3/16/2019 4:03 PM

## 2019-03-17 LAB
TSH REFLEX: 1.57 UIU/ML (ref 0.27–4.2)
TSH REFLEX: 2.87 UIU/ML (ref 0.27–4.2)

## 2019-03-17 PROCEDURE — 1200000000 HC SEMI PRIVATE

## 2019-03-17 PROCEDURE — 51798 US URINE CAPACITY MEASURE: CPT

## 2019-03-17 PROCEDURE — 84443 ASSAY THYROID STIM HORMONE: CPT

## 2019-03-17 PROCEDURE — 6360000002 HC RX W HCPCS: Performed by: FAMILY MEDICINE

## 2019-03-17 PROCEDURE — 6370000000 HC RX 637 (ALT 250 FOR IP): Performed by: FAMILY MEDICINE

## 2019-03-17 PROCEDURE — 2580000003 HC RX 258: Performed by: INTERNAL MEDICINE

## 2019-03-17 PROCEDURE — 2580000003 HC RX 258: Performed by: FAMILY MEDICINE

## 2019-03-17 PROCEDURE — 6370000000 HC RX 637 (ALT 250 FOR IP): Performed by: INTERNAL MEDICINE

## 2019-03-17 PROCEDURE — 51701 INSERT BLADDER CATHETER: CPT

## 2019-03-17 PROCEDURE — 36415 COLL VENOUS BLD VENIPUNCTURE: CPT

## 2019-03-17 RX ORDER — HYDRALAZINE HYDROCHLORIDE 25 MG/1
25 TABLET, FILM COATED ORAL EVERY 8 HOURS SCHEDULED
Status: DISCONTINUED | OUTPATIENT
Start: 2019-03-17 | End: 2019-03-18

## 2019-03-17 RX ORDER — SODIUM CHLORIDE 9 MG/ML
INJECTION, SOLUTION INTRAVENOUS CONTINUOUS
Status: DISCONTINUED | OUTPATIENT
Start: 2019-03-17 | End: 2019-03-19 | Stop reason: HOSPADM

## 2019-03-17 RX ADMIN — Medication 1 CAPSULE: at 08:52

## 2019-03-17 RX ADMIN — ENOXAPARIN SODIUM 40 MG: 40 INJECTION SUBCUTANEOUS at 08:51

## 2019-03-17 RX ADMIN — AMLODIPINE BESYLATE 10 MG: 10 TABLET ORAL at 08:52

## 2019-03-17 RX ADMIN — CEFTRIAXONE 1 G: 1 INJECTION, POWDER, FOR SOLUTION INTRAMUSCULAR; INTRAVENOUS at 06:58

## 2019-03-17 RX ADMIN — VITAMIN D, TAB 1000IU (100/BT) 2000 UNITS: 25 TAB at 08:52

## 2019-03-17 RX ADMIN — SODIUM CHLORIDE: 900 INJECTION, SOLUTION INTRAVENOUS at 12:14

## 2019-03-17 RX ADMIN — ACETAMINOPHEN 650 MG: 325 TABLET ORAL at 22:24

## 2019-03-17 RX ADMIN — HYDRALAZINE HYDROCHLORIDE 10 MG: 10 TABLET, FILM COATED ORAL at 15:06

## 2019-03-17 RX ADMIN — HYDRALAZINE HYDROCHLORIDE 10 MG: 10 TABLET, FILM COATED ORAL at 06:58

## 2019-03-17 RX ADMIN — Medication 10 ML: at 08:13

## 2019-03-17 RX ADMIN — TAMSULOSIN HYDROCHLORIDE 0.4 MG: 0.4 CAPSULE ORAL at 08:52

## 2019-03-17 RX ADMIN — HYDRALAZINE HYDROCHLORIDE 25 MG: 25 TABLET, FILM COATED ORAL at 22:24

## 2019-03-17 ASSESSMENT — PAIN DESCRIPTION - ONSET: ONSET: ON-GOING

## 2019-03-17 ASSESSMENT — PAIN SCALES - GENERAL
PAINLEVEL_OUTOF10: 0
PAINLEVEL_OUTOF10: 3
PAINLEVEL_OUTOF10: 0
PAINLEVEL_OUTOF10: 0

## 2019-03-17 ASSESSMENT — PAIN DESCRIPTION - LOCATION: LOCATION: HIP

## 2019-03-17 ASSESSMENT — PAIN - FUNCTIONAL ASSESSMENT: PAIN_FUNCTIONAL_ASSESSMENT: PREVENTS OR INTERFERES WITH ALL ACTIVE AND SOME PASSIVE ACTIVITIES

## 2019-03-17 ASSESSMENT — PAIN DESCRIPTION - FREQUENCY: FREQUENCY: INTERMITTENT

## 2019-03-17 ASSESSMENT — PAIN DESCRIPTION - DESCRIPTORS: DESCRIPTORS: ACHING

## 2019-03-17 ASSESSMENT — PAIN DESCRIPTION - ORIENTATION: ORIENTATION: LEFT

## 2019-03-17 ASSESSMENT — PAIN DESCRIPTION - PAIN TYPE: TYPE: ACUTE PAIN

## 2019-03-17 ASSESSMENT — PAIN DESCRIPTION - PROGRESSION: CLINICAL_PROGRESSION: NOT CHANGED

## 2019-03-17 NOTE — PROGRESS NOTES
Hospitalist Progress Note      PCP: Jack Acevedo MD    Date of Admission: 3/15/2019    Chief Complaint: Back pain    Hospital Course: Admitted for hypertensive urgency, urinary retention and back pain. Of note, patient sees Dr. Graciela Guevara.  requested Dr. Graciela Guevara admit the patient but his covering physician declined. Subjective: Pt sleeping. Open eyes to voice. States she is tired. Medications:  Reviewed    Infusion Medications   Scheduled Medications    amLODIPine  10 mg Oral Daily    b complex vitamins  1 capsule Oral Daily    vitamin D  2,000 Units Oral Daily    hydrALAZINE  10 mg Oral 3 times per day    sodium chloride flush  10 mL Intravenous 2 times per day    enoxaparin  40 mg Subcutaneous Daily    tamsulosin  0.4 mg Oral Daily    cefTRIAXone (ROCEPHIN) IV  1 g Intravenous Q24H    risperiDONE  0.25 mg Oral Daily    lidocaine  1 patch Transdermal Daily     PRN Meds: sodium chloride flush, acetaminophen, ondansetron      Intake/Output Summary (Last 24 hours) at 3/17/2019 0853  Last data filed at 3/17/2019 0040  Gross per 24 hour   Intake 180 ml   Output 275 ml   Net -95 ml       Physical Exam Performed:    BP (!) 175/94   Pulse 105   Temp 97.8 °F (36.6 °C) (Oral)   Resp 16   Ht 4' 11.84\" (1.52 m)   Wt 112 lb (50.8 kg)   SpO2 95%   Breastfeeding? No   BMI 21.99 kg/m²     General appearance: No apparent distress, appears stated age and cooperative. HEENT: Pupils equal, round, and reactive to light. Conjunctivae/corneas clear. Neck: Supple, with full range of motion. No jugular venous distention. Trachea midline. Respiratory:  Normal respiratory effort. Clear to auscultation, bilaterally without Rales/Wheezes/Rhonchi. Cardiovascular: Regular rate and rhythm with normal S1/S2 without murmurs, rubs or gallops. Abdomen: Soft, non-tender, non-distended with normal bowel sounds. Musculoskeletal: No clubbing, cyanosis or edema bilaterally.   Full range of motion without deformity. Skin: Skin color, texture, turgor normal.  No rashes or lesions. Neurologic: Grossly non-focal  Psychiatric: Alert X1  Capillary Refill: Brisk,< 3 seconds   Peripheral Pulses: +2 palpable, equal bilaterally       Labs:   Recent Labs     03/15/19  1844   WBC 6.4   HGB 14.1   HCT 41.7        Recent Labs     03/15/19  1844      K 3.2*   CL 99   CO2 29   BUN 12   CREATININE 0.6   CALCIUM 9.4     No results for input(s): AST, ALT, BILIDIR, BILITOT, ALKPHOS in the last 72 hours. No results for input(s): INR in the last 72 hours. No results for input(s): [place] in the last 72 hours. Urinalysis:      Lab Results   Component Value Date    NITRU POSITIVE 03/16/2019    WBCUA 0-2 03/16/2019    BACTERIA 1+ 03/16/2019    RBCUA 20-50 03/16/2019    BLOODU MODERATE 03/16/2019    SPECGRAV 1.010 03/16/2019    GLUCOSEU Negative 03/16/2019       Radiology:  MRI LUMBAR SPINE WO CONTRAST   Final Result      1. Approximately 30% loss of height of the superior endplate of L2. There is no edema within the L2 vertebral body; therefore, this fracture is remote. 2. Hemangioma of the L3 vertebral body. 3.  Mild diffuse disc bulging L4-L5. Assessment/Plan:    HTN urgency:  BP was elevated on admission due to not taking home meds (son concerned about side effects)   Was noted to have urinary retention as well. 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  Want to take her home after short period at SNF  Discussed the option of long term care but family does not want to consider it  Spoke with SW to help with disposition      DVT Prophylaxis: Lovenox  Diet: DIET GENERAL;  Code Status: Full Code    PT/OT Eval Status: Ordered    Dispo - SNF once available    Geraldo Duverney, MD

## 2019-03-17 NOTE — PROGRESS NOTES
Pt incontinent urine. Brief changed and pt bladder scanned for 211 ml in bladder. Pt up to chair with karl steady but seems uncomfortable. Chair alarm on. Will monitor.

## 2019-03-17 NOTE — PROGRESS NOTES
Pt has not voided since the first time the shift. Pt bladder scanned for 311 ml of urine. Pt straight cathed for 525 ml of dark donavan urine. Will continue to monitor intake and output.

## 2019-03-17 NOTE — CARE COORDINATION
3/17/2019  [facility] Case Management Department    Patient: Martinez Barr  MRN: [id number] / : 1945  ACCT: [de-identified]          Admission Documentation  Attending Provider: Edna Machado MD  Admit date/time: 3/15/2019  5:47 PM  Status: Inpatient [101]  Diagnosis: Hypertensive urgency     Readmission within last 30 days:  yes     Living Situation  Discharge Planning  Living Arrangements: Spouse/Significant Other  Support Systems: Spouse/Significant Other  Potential Assistance Needed: Geovanny Garcia, Outpatient PT/OT, Home Care  Type of Home Care Services: None  Patient expects to be discharged to[de-identified] home    Service Assessment       Values / Beliefs  Do you have any ethnic, cultural, sacramental, or spiritual Lutheran needs you would like us to be aware of while you are in the hospital?: No    Advance Directives (For Healthcare)  Pre-existing DNR Comfort Care/DNR Arrest/DNI Order: No  Healthcare Directive: Yes, patient has an advance directive for healthcare treatment  Type of Healthcare Directive: Durable power of  for health care  Copy in Chart: No, copy requested from family                        Destination  skilled nursing facility     Patient lives at home with son Odalis Ramon, who has Asperger autism and her spouse Neri Forbese. They have no community services prior to admission and patient has multiple ER visits in the past week post fall at home and increased confusion due to UTI. Patient has vascular dementia at baseline. Family is in agreement for SNF placement and list was provided for review. Family reports that they live close to [address] [place] [address] and request referrals be sent to both. CM has faxed referrals for review, PT/OT evals pending at this time.     Durable Medical Equipment   Family reports no DME use prior to admission    Home Health/Skilled Nursing  Services at Discharge: [facility], Occupational Therapy and Nursing 3x week  Home care at home? Yes Provider: [facility] Provider Phone: [phone]    Patient has been set [facility] with [facility] at last admission on 3/14. Therapy Consults  PT evaluation needed?: Yes (Comment)  OT Evalulation Needed?: Yes (Comment)  SLP evaluation needed?: No    Home Medical Care  Patient and spouse manage home medical care prior to admission. Pharmacy: [facility]   Potential Assistance Purchasing Medications:  No  Does patient want to participate in local refill/meds to beds program?: Not Assessed    Goals of Care  Patient expects to be discharged to[de-identified] home  Patient plans for SNF: YES- referrals sent to [facility] and [facility] for review. CM spent nearly 1 hour discussing home situation and recent events with patients spouse Elo Cade and son Dhruv Yu. CM discussed SNF at discharge and they are in agreement. CM answered as many questions as possible from both spouse and son. CM reviewed process for SNF admission and need for PT/OT to see patient and complete evaluations. CM informed spouse that once completed and determination of SNF acceptance that CM/SW would call and update spouse on plan of care and plan for discharge. Per spouse, transport is requested to SNF. CM/SW can arrange at discharge. CM also spoke briefly with patient in regards to conversation with son and spouse and she is aware and agreeable during bedside conversation. Spouse plans to go to the gym and then to work tomorrow morning and will be available at anytime for discussion.  Spouse owns his own business and is able to leave if needed to come to the hospital.    Guevara [address]- [phone]  [facility]- [phone]     Mode of transport from hospital: ambulance per family request     Factors facilitating achievement of predicted outcomes: Family support, Cooperative and Pleasant    Barriers to discharge: Cognitive deficit, Medical complications and Medication managment     Alex Lala RN  [facility]  Case Management Department  Ph: [phone] Fax: [phone]

## 2019-03-17 NOTE — PLAN OF CARE
Problem: Pain:  Goal: Pain level will decrease  Description  Pain level will decrease  Outcome: Ongoing  Note:   Pt reports pain with repositioning. She has scattered bruising to her buttocks and hips post fall. Pt repositioned for comfort and lidoderm patch to lower back. Will continue to assess pt for pain and intervene to avoid discomfort. Problem: Falls - Risk of:  Goal: Will remain free from falls  Description  Will remain free from falls  3/17/2019 1854 by Tyree Mendez RN  Outcome: Ongoing  Note:   AVASYS camera in room. Pt is confused. Bed alarm on and audible. Bedside table, call light and needs within reach. Will continue to round on pt for safety/needs. 3/17/2019 0630 by Christine Matias RN  Outcome: Ongoing  Note:   Pt is free from falls at this time. Bed is in lowest position, wheels are locked and bed alarm is set. Call light and belongings are within reach. Visual fall sign/blanket are posted. Avasys Monitor in place. Will continue to monitor. 3/17/2019 0629 by Christine Matias RN  Outcome: Ongoing  Note:   Pt is free from falls at this time. Bed is in lowest position, wheels are locked and bed alarm is set. Call light and belongings are within reach. Visual fall sign/blanket are posted. Will continue to monitor. Problem: Falls - Risk of:  Goal: Absence of physical injury  Description  Absence of physical injury  3/17/2019 0630 by Christine Matias RN  Outcome: Ongoing  Note:   Pt is free from accidental physical injury at this time. Pt is AxOx4. Fall Risk assessed per shift. ID band in place. Bed in lowest position, wheels locked and alarm is set. Call light and belongings are within reach. Maintaining a safe environment. Will continue to assess and monitor. 3/17/2019 0629 by Christine Matias RN  Outcome: Ongoing  Note:   Pt is free from accidental physical injury at this time. Pt is AxOx4. Fall Risk assessed per shift. ID band in place.  Bed in lowest position, wheels locked and alarm is set. Call light and belongings are within reach. Maintaining a safe environment. Will continue to assess and monitor. Problem: Risk for Impaired Skin Integrity  Goal: Tissue integrity - skin and mucous membranes  Description  Structural intactness and normal physiological function of skin and  mucous membranes. 3/17/2019 1854 by Cyndi Long RN  Note:   Pt has scattered bruising and is incontinent. Skin care provided after each episode of incontinence. Barrier cream applied. Will continue to turn, reposition pt as needed for comfort and to avoid skin breakdown. 3/17/2019 0630 by Karen Malloy RN  Outcome: Ongoing  Note:   Pt at risk for skin breakdown. Skin assessment complete. No new signs of skin breakdown. Pts skin cleansed with inc cleanser. Pt repositioned in bed with pillow support. Will continue to monitor. 3/17/2019 0629 by Karen Malloy RN  Outcome: Ongoing  Note:   Pt at risk for skin breakdown. Skin assessment complete. No new signs of skin breakdown. Pts skin cleansed with inc cleanser. Pt repositioned in bed with pillow support. Will continue to monitor.

## 2019-03-18 LAB
ANION GAP SERPL CALCULATED.3IONS-SCNC: 18 MMOL/L (ref 3–16)
BASOPHILS ABSOLUTE: 0 K/UL (ref 0–0.2)
BASOPHILS RELATIVE PERCENT: 0.4 %
BUN BLDV-MCNC: 14 MG/DL (ref 7–20)
CALCIUM SERPL-MCNC: 9.2 MG/DL (ref 8.3–10.6)
CHLORIDE BLD-SCNC: 100 MMOL/L (ref 99–110)
CO2: 23 MMOL/L (ref 21–32)
CREAT SERPL-MCNC: 0.6 MG/DL (ref 0.6–1.2)
EOSINOPHILS ABSOLUTE: 0.1 K/UL (ref 0–0.6)
EOSINOPHILS RELATIVE PERCENT: 1.5 %
GFR AFRICAN AMERICAN: >60
GFR NON-AFRICAN AMERICAN: >60
GLUCOSE BLD-MCNC: 149 MG/DL (ref 70–99)
HCT VFR BLD CALC: 43.1 % (ref 36–48)
HEMOGLOBIN: 14.5 G/DL (ref 12–16)
LYMPHOCYTES ABSOLUTE: 0.4 K/UL (ref 1–5.1)
LYMPHOCYTES RELATIVE PERCENT: 6.2 %
MCH RBC QN AUTO: 29.5 PG (ref 26–34)
MCHC RBC AUTO-ENTMCNC: 33.7 G/DL (ref 31–36)
MCV RBC AUTO: 87.5 FL (ref 80–100)
MONOCYTES ABSOLUTE: 0.4 K/UL (ref 0–1.3)
MONOCYTES RELATIVE PERCENT: 5.5 %
NEUTROPHILS ABSOLUTE: 5.8 K/UL (ref 1.7–7.7)
NEUTROPHILS RELATIVE PERCENT: 86.4 %
PDW BLD-RTO: 14.1 % (ref 12.4–15.4)
PLATELET # BLD: 244 K/UL (ref 135–450)
PMV BLD AUTO: 8.6 FL (ref 5–10.5)
POTASSIUM SERPL-SCNC: 2.8 MMOL/L (ref 3.5–5.1)
RBC # BLD: 4.93 M/UL (ref 4–5.2)
SODIUM BLD-SCNC: 141 MMOL/L (ref 136–145)
WBC # BLD: 6.8 K/UL (ref 4–11)

## 2019-03-18 PROCEDURE — 2580000003 HC RX 258: Performed by: INTERNAL MEDICINE

## 2019-03-18 PROCEDURE — 36415 COLL VENOUS BLD VENIPUNCTURE: CPT

## 2019-03-18 PROCEDURE — 80048 BASIC METABOLIC PNL TOTAL CA: CPT

## 2019-03-18 PROCEDURE — 97161 PT EVAL LOW COMPLEX 20 MIN: CPT

## 2019-03-18 PROCEDURE — 97535 SELF CARE MNGMENT TRAINING: CPT

## 2019-03-18 PROCEDURE — 6360000002 HC RX W HCPCS: Performed by: INTERNAL MEDICINE

## 2019-03-18 PROCEDURE — 1200000000 HC SEMI PRIVATE

## 2019-03-18 PROCEDURE — 51798 US URINE CAPACITY MEASURE: CPT

## 2019-03-18 PROCEDURE — 6370000000 HC RX 637 (ALT 250 FOR IP): Performed by: STUDENT IN AN ORGANIZED HEALTH CARE EDUCATION/TRAINING PROGRAM

## 2019-03-18 PROCEDURE — 6370000000 HC RX 637 (ALT 250 FOR IP): Performed by: INTERNAL MEDICINE

## 2019-03-18 PROCEDURE — 85025 COMPLETE CBC W/AUTO DIFF WBC: CPT

## 2019-03-18 PROCEDURE — 97116 GAIT TRAINING THERAPY: CPT

## 2019-03-18 PROCEDURE — 97166 OT EVAL MOD COMPLEX 45 MIN: CPT

## 2019-03-18 PROCEDURE — 6360000002 HC RX W HCPCS: Performed by: FAMILY MEDICINE

## 2019-03-18 PROCEDURE — 6370000000 HC RX 637 (ALT 250 FOR IP): Performed by: FAMILY MEDICINE

## 2019-03-18 RX ORDER — LOSARTAN POTASSIUM 50 MG/1
50 TABLET ORAL DAILY
Status: DISCONTINUED | OUTPATIENT
Start: 2019-03-18 | End: 2019-03-18

## 2019-03-18 RX ORDER — HYDRALAZINE HYDROCHLORIDE 50 MG/1
50 TABLET, FILM COATED ORAL EVERY 8 HOURS SCHEDULED
Status: DISCONTINUED | OUTPATIENT
Start: 2019-03-18 | End: 2019-03-18

## 2019-03-18 RX ORDER — POTASSIUM CHLORIDE 1.5 G/1.77G
40 POWDER, FOR SOLUTION ORAL EVERY 4 HOURS
Status: COMPLETED | OUTPATIENT
Start: 2019-03-18 | End: 2019-03-18

## 2019-03-18 RX ORDER — HYDRALAZINE HYDROCHLORIDE 50 MG/1
50 TABLET, FILM COATED ORAL EVERY 8 HOURS SCHEDULED
Status: DISCONTINUED | OUTPATIENT
Start: 2019-03-18 | End: 2019-03-19 | Stop reason: HOSPADM

## 2019-03-18 RX ORDER — BETHANECHOL CHLORIDE 10 MG/1
10 TABLET ORAL 3 TIMES DAILY
Status: DISCONTINUED | OUTPATIENT
Start: 2019-03-18 | End: 2019-03-19 | Stop reason: HOSPADM

## 2019-03-18 RX ADMIN — TAMSULOSIN HYDROCHLORIDE 0.4 MG: 0.4 CAPSULE ORAL at 08:40

## 2019-03-18 RX ADMIN — ENOXAPARIN SODIUM 40 MG: 40 INJECTION SUBCUTANEOUS at 08:40

## 2019-03-18 RX ADMIN — POTASSIUM CHLORIDE 40 MEQ: 1.5 POWDER, FOR SOLUTION ORAL at 15:44

## 2019-03-18 RX ADMIN — AMLODIPINE BESYLATE 10 MG: 10 TABLET ORAL at 08:40

## 2019-03-18 RX ADMIN — SODIUM CHLORIDE: 900 INJECTION, SOLUTION INTRAVENOUS at 06:14

## 2019-03-18 RX ADMIN — ACETAMINOPHEN 650 MG: 325 TABLET ORAL at 20:41

## 2019-03-18 RX ADMIN — VITAMIN D, TAB 1000IU (100/BT) 2000 UNITS: 25 TAB at 08:40

## 2019-03-18 RX ADMIN — HYDRALAZINE HYDROCHLORIDE 25 MG: 25 TABLET, FILM COATED ORAL at 06:13

## 2019-03-18 RX ADMIN — BETHANECHOL CHLORIDE 10 MG: 10 TABLET ORAL at 20:41

## 2019-03-18 RX ADMIN — POTASSIUM CHLORIDE 40 MEQ: 1.5 POWDER, FOR SOLUTION ORAL at 13:50

## 2019-03-18 RX ADMIN — CEFTRIAXONE 1 G: 1 INJECTION, POWDER, FOR SOLUTION INTRAMUSCULAR; INTRAVENOUS at 06:13

## 2019-03-18 RX ADMIN — BETHANECHOL CHLORIDE 10 MG: 10 TABLET ORAL at 13:50

## 2019-03-18 RX ADMIN — Medication 1 CAPSULE: at 08:40

## 2019-03-18 RX ADMIN — HYDRALAZINE HYDROCHLORIDE 50 MG: 50 TABLET, FILM COATED ORAL at 13:50

## 2019-03-18 ASSESSMENT — PAIN DESCRIPTION - ONSET: ONSET: ON-GOING

## 2019-03-18 ASSESSMENT — PAIN DESCRIPTION - LOCATION: LOCATION: ABDOMEN

## 2019-03-18 ASSESSMENT — PAIN SCALES - GENERAL
PAINLEVEL_OUTOF10: 0
PAINLEVEL_OUTOF10: 2
PAINLEVEL_OUTOF10: 0

## 2019-03-18 ASSESSMENT — PAIN DESCRIPTION - ORIENTATION: ORIENTATION: LOWER

## 2019-03-18 ASSESSMENT — PAIN DESCRIPTION - PROGRESSION: CLINICAL_PROGRESSION: NOT CHANGED

## 2019-03-18 ASSESSMENT — PAIN - FUNCTIONAL ASSESSMENT: PAIN_FUNCTIONAL_ASSESSMENT: PREVENTS OR INTERFERES SOME ACTIVE ACTIVITIES AND ADLS

## 2019-03-18 ASSESSMENT — PAIN DESCRIPTION - FREQUENCY: FREQUENCY: CONTINUOUS

## 2019-03-18 ASSESSMENT — PAIN DESCRIPTION - DESCRIPTORS: DESCRIPTORS: SORE

## 2019-03-18 ASSESSMENT — PAIN DESCRIPTION - PAIN TYPE: TYPE: ACUTE PAIN

## 2019-03-18 NOTE — PROGRESS NOTES
Hospitalist Progress Note      PCP: Yun Pinedo MD    Date of Admission: 3/15/2019    Chief Complaint: Back pain    Hospital Course: Admitted for hypertensive urgency, urinary retention and back pain. Of note, patient sees Dr. Kacie Coe.  requested Dr. Kacie Coe admit the patient but his covering physician declined. Subjective: Pt sitting up in chair. No complaints. Tolerating PO intake. Unsure of why she is here, stating she thinks she had some stroke-like symptoms. States she's never had any urinary retention issues. Does not take any BP meds at home. Medications:  Reviewed    Infusion Medications    sodium chloride 50 mL/hr at 03/18/19 7042     Scheduled Medications    bethanechol  10 mg Oral TID    hydrALAZINE  50 mg Oral 3 times per day    potassium chloride  40 mEq Oral Q4H    amLODIPine  10 mg Oral Daily    b complex vitamins  1 capsule Oral Daily    vitamin D  2,000 Units Oral Daily    sodium chloride flush  10 mL Intravenous 2 times per day    enoxaparin  40 mg Subcutaneous Daily    tamsulosin  0.4 mg Oral Daily    lidocaine  1 patch Transdermal Daily     PRN Meds: sodium chloride flush, acetaminophen, ondansetron      Intake/Output Summary (Last 24 hours) at 3/18/2019 1329  Last data filed at 3/18/2019 1002  Gross per 24 hour   Intake 1520.11 ml   Output 1175 ml   Net 345.11 ml       Physical Exam Performed:    /70   Pulse 101   Temp 97.7 °F (36.5 °C) (Oral)   Resp 18   Ht 4' 11.84\" (1.52 m)   Wt 112 lb (50.8 kg)   SpO2 97%   Breastfeeding? No   BMI 21.99 kg/m²       Physical Exam   Constitutional: She appears well-developed and well-nourished. HENT:   Head: Normocephalic and atraumatic. Eyes: EOM are normal.   Cardiovascular: Normal rate, regular rhythm and normal heart sounds. No murmur heard. Pulmonary/Chest: Effort normal and breath sounds normal. No respiratory distress. Abdominal: Soft.  Bowel sounds are normal. She exhibits no distension. There is no tenderness. Musculoskeletal: She exhibits no edema. Neurological:   Oriented only to person. OK attention. Skin: Skin is warm and dry. She is not diaphoretic. Psychiatric: She has a normal mood and affect. Labs:   Recent Labs     03/15/19  1844 03/18/19  1122   WBC 6.4 6.8   HGB 14.1 14.5   HCT 41.7 43.1    244     Recent Labs     03/15/19  1844 03/18/19  1122    141   K 3.2* 2.8*   CL 99 100   CO2 29 23   BUN 12 14   CREATININE 0.6 0.6   CALCIUM 9.4 9.2     No results for input(s): AST, ALT, BILIDIR, BILITOT, ALKPHOS in the last 72 hours. No results for input(s): INR in the last 72 hours. No results for input(s): Madalynn Gustavo in the last 72 hours. Urinalysis:      Lab Results   Component Value Date    NITRU POSITIVE 03/16/2019    WBCUA 0-2 03/16/2019    BACTERIA 1+ 03/16/2019    RBCUA 20-50 03/16/2019    BLOODU MODERATE 03/16/2019    SPECGRAV 1.010 03/16/2019    GLUCOSEU Negative 03/16/2019       Radiology:  MRI LUMBAR SPINE WO CONTRAST   Final Result      1. Approximately 30% loss of height of the superior endplate of L2. There is no edema within the L2 vertebral body; therefore, this fracture is remote. 2. Hemangioma of the L3 vertebral body. 3.  Mild diffuse disc bulging L4-L5. Assessment/Plan:    HTN urgency: BP remains elevated   - Increased hydralazine to 50 TID  - Continue norvasc at 10  - Would like to switch from hydralazine to losartan since compliance is an issue. Will call patient's son and discuss management. Will also ask family if they would like us to continue seeing the patient tomorrow or Dr. Watson Edwards. Urinary retention:  - Flomax daily   - Start urecholine today   - D/c yap tomorrow and assess response     UTI (simple cystitis): - Ceftriaxone day 3/3     Back pain:  Lidocaine patch    Dementia:  - Patient not agreeable to LTC but will consider SNF.   - Patient has  and son at home.  They aren't available ATC but would still like her to eventually return home       DVT Prophylaxis: Lovenox  Diet: DIET GENERAL;  Code Status: Full Code    PT/OT Eval Status: OT 15/24, PT 16/24    Dispo - SNF ready when patient OK for discharge. Maybe in next 24 hours.     Lynne Magaña MD

## 2019-03-18 NOTE — SIGNIFICANT EVENT
Spoke with patient's  who states he would like Dr. Fritz Maldonado (patient's PCP) to take over care as of tomorrow. Discussed noncompliance with medications and he states this is an ongoing issue, it's been quite frustrating for him but he doesn't know of a solution for it. Will consult patient's PCP Dr. Fritz Maldonado to see the patient tomorrow.

## 2019-03-18 NOTE — PROGRESS NOTES
Occupational Therapy   Occupational Therapy Initial Assessment and Treatment  Date: 3/18/2019   Patient Name: Carlie Michael  MRN: [id number]     : 1945    Date of Service: 3/18/2019    Discharge Recommendations:    Carlie Michael scored a 15/24 on the AM-PAC ADL Inpatient form. Current research shows that an AM-PAC score of 17 or less is typically not associated with a discharge to the patient's home setting. Based on the patients AM-PAC score and their current ADL deficits, it is recommended that the patient have 3-5 sessions per week of Occupational Therapy at d/c to increase the patients independence. OT Equipment Recommendations  Equipment Needed: No  Other: Defer to next level of care    Assessment   Performance deficits / Impairments: Decreased functional mobility ; Decreased ADL status; Decreased safe awareness;Decreased balance;Decreased cognition  Assessment: Pt currently requires assist of 1 for all transfers and mobility with walker and verbal cuing and assist for safe ADLs due to decreased balance and poor safety awareness/insight. Pt has baseline dementia. Pt is not safe to ambulate alone. Family reports difficulty caring for pt at this level of functioning per chart. Pt would benefit from ongoing OT tx  Treatment Diagnosis: Impaired ADLs, mobility, balance and safety awareness 2/2 UTI  Prognosis: Fair  Decision Making: Medium Complexity  REQUIRES OT FOLLOW UP: Yes  Activity Tolerance  Activity Tolerance: Patient Tolerated treatment well  Safety Devices  Safety Devices in place: Yes  Type of devices: Nurse notified; Chair alarm in place;Call light within reach; Left in chair;Gait belt(Avasys)           Patient Diagnosis(es): The primary encounter diagnosis was Delirium. A diagnosis of Acute midline low back pain without sciatica was also pertinent to this visit.      has a past medical history of Hyperlipidemia, Hypertension, Hypothyroidism, Osteoporosis, Patient noncompliance, Renal colic, and Vascular dementia. has a past surgical history that includes  section. Treatment Diagnosis: Impaired ADLs, mobility, balance and safety awareness 2/2 UTI      Restrictions  Position Activity Restriction  Other position/activity restrictions: Up with assist     Subjective   General  Chart Reviewed: Yes  Patient assessed for rehabilitation services?: Yes  Additional Pertinent Hx: Vascular dementia, HTN, osteoporosis  Family / Caregiver Present: No  Referring Practitioner: Mario Vegas MD  Diagnosis: Pt with multiple recent admissions, presents from home wth HTN, UTI, and fall. Subjective  Subjective: Pt in bed, agreeable to tx. RN cleared pt to participate. Pain Assessment - denies, pt satisfied. Social/Functional History  Social/Functional History  Lives With: Spouse(and son,  works full time)  Type of Home: [employer] Layout: Two level, Bed/Bath upstairs, Laundry in basement  Home Access: Stairs to enter with rails  Entrance Stairs - Number of Steps: 5 REJI  Bathroom Toilet: Standard(sink for leverage)  Home Equipment: [employer]  ADL Assistance: Independent  Homemaking Assistance: Independent  Ambulation Assistance: Independent  Transfer Assistance: Independent  Active : Yes  Occupation: Retired  Type of occupation: [employer]  Leisure & Hobbies: reading   Additional Comments: Pt reports 2 falls in the past 6 months.  Pt is questionable historian and provided inconsistent information       Objective   Vision: Impaired  Vision Exceptions: Wears glasses for reading  Hearing: Within functional limits      Orientation  Overall Orientation Status: Impaired  Orientation Level: Oriented to person;Disoriented to situation;Disoriented to time;Disoriented to place        Balance  Sitting Balance: Minimal assistance(dynamic balance EOB)  Standing Balance: Minimal assistance(with RW)    Functional Mobility  Functional - Mobility Device: Rolling Walker  Activity: To/from bathroom(plus functional household distance)  Assist Level: Minimal assistance  Functional Mobility Comments: Mod A to navigate turns with RW    Toilet Transfers  Toilet - Technique: Ambulating(with RW)  Equipment Used: Standard toilet(with R grab bar)  Toilet Transfer: Minimal assistance    ADL  Feeding: Stand by assistance;Setup(seated level; CGA to drink standing)  Grooming: Contact guard assistance(to brush teeth standing; mod VCs for cognitive aspects of task)  LE Dressing: Moderate assistance(to don B socks seated EOB with increased time/effort)     Tone RUE  RUE Tone: Normotonic  Tone LUE  LUE Tone: Normotonic  Coordination  Movements Are Fluid And Coordinated: Yes        Bed mobility  Supine to Sit: Minimal assistance(HOB raised, use of bed aril)  Scooting: Minimal assistance(to EOB)     Transfers  Sit to stand: Dependent/Total(min A x2 to stand from EOB with RW)  Stand to sit: Minimal assistance        Cognition  Overall Cognitive Status: Exceptions  Following Commands: Follows one step commands with repetition  Attention Span: Difficulty attending to directions; Difficulty dividing attention  Memory: Decreased recall of recent events;Decreased short term memory  Safety Judgement: Decreased awareness of need for safety;Decreased awareness of need for assistance  Problem Solving: Decreased awareness of errors;Assistance required to generate solutions;Assistance required to implement solutions;Assistance required to identify errors made;Assistance required to correct errors made  Insights: Decreased awareness of deficits  Initiation: Requires cues for some  Sequencing: Requires cues for some                 LUE AROM (degrees)  LUE AROM : WFL(based on functional presentation)  RUE AROM (degrees)  RUE AROM : WFL(based on functional presentation)     LUE Strength  Gross LUE Strength: Exceptions to WFL(based on functional presentation)  RUE Strength  Gross RUE Strength: Exceptions to WFL(based on functional presentation) Education: Role of OT, safe t/f training, safe use of DME, awareness of deficits, discharge planning, ADL as therapeutic exercise, importance of OOB, cognitive orientation    Plan   Plan  Times per week: 2-5x    AM-PAC Score        AM-PAC Inpatient Daily Activity Raw Score: 15  AM-PAC Inpatient ADL T-Scale Score : 34.69  ADL Inpatient CMS 0-100% Score: 56.46  ADL Inpatient CMS G-Code Modifier : CK    Goals  Short term goals  Time Frame for Short term goals: by discharge  Short term goal 1: Pt complete functional ADL transfers with SBA using LRAD  Short term goal 2: Pt complete LB dressing with Min A  Short term goal 3: Pt complete toileting with sba  Short term goal 4: 3 grooming tasks standing with SBA  Patient Goals   Patient goals : \"       Therapy Time   Individual Concurrent Group Co-treatment   Time In 0911         Time Out 0937         Minutes 26         Timed Code Treatment Minutes: 11 Minutes       If patient is discharged prior to next treatment session, this note will serve as the discharge summary.   Alexa Wadsworth, OTR/L #[id number]

## 2019-03-18 NOTE — PLAN OF CARE
Problem: Falls - Risk of:  Goal: Will remain free from falls  Description  Will remain free from falls  3/17/2019 0630 by Karen Malloy RN  Outcome: Ongoing  Note:   Pt is free from falls at this time. Bed is in lowest position, wheels are locked and bed alarm is set. Call light and belongings are within reach. Visual fall sign/blanket are posted. Avasys Monitor in place. Will continue to monitor. Problem: Falls - Risk of:  Goal: Absence of physical injury  Description  Absence of physical injury  3/17/2019 0630 by Karen Malloy RN  Outcome: Ongoing  Note:   Pt is free from accidental physical injury at this time. Pt is AxOx4. Fall Risk assessed per shift. ID band in place. Bed in lowest position, wheels locked and alarm is set. Call light and belongings are within reach. Maintaining a safe environment. Will continue to assess and monitor. Problem: Risk for Impaired Skin Integrity  Goal: Tissue integrity - skin and mucous membranes  Description  Structural intactness and normal physiological function of skin and  mucous membranes. 3/17/2019 0630 by Karen Malloy RN  Outcome: Ongoing  Note:   Pt at risk for skin breakdown. Skin assessment complete. No new signs of skin breakdown. Pts skin cleansed with inc cleanser. Pt repositioned in bed with pillow support. Will continue to monitor.

## 2019-03-18 NOTE — PROGRESS NOTES
Physical Therapy    Facility/Department: [address]  Initial Assessment and Treatment    NAME: Eddie Covarrubias  : 1945  MRN: [id number]    Date of Service: 3/18/2019    Discharge Recommendations:  Eddie Covarrubias scored a 16/24 on the AM-PAC short mobility form. Current research shows that an AM-PAC score of 17 or less is typically not associated with a discharge to the patient's home setting. Based on the patients AM-PAC score and their current functional mobility deficits, it is recommended that the patient have 3-5 sessions per week of Physical Therapy at d/c to increase the patients independence. Patient would benefit from continued therapy after discharge   PT Equipment Recommendations  Equipment Needed: No    Assessment   Assessment: Pt is 69 yo F with decreased functional mobility, and decreased cognition. Pt requiring assist for all activity. Rec continued inpt PT at d/c, as pt presently would require 24 hour physical care at home and presents as a fall risk. Will follow. Treatment Diagnosis: Decreased functional mobility related to hypertensive urgency  Prognosis: Good  Decision Making: Low Complexity  Patient Education: Role of PT- pt verbalized partial understanding  REQUIRES PT FOLLOW UP: Yes  Activity Tolerance  Activity Tolerance: Patient Tolerated treatment well;Patient limited by fatigue;Patient limited by cognitive status       Patient Diagnosis(es): The primary encounter diagnosis was Delirium. A diagnosis of Acute midline low back pain without sciatica was also pertinent to this visit. has a past medical history of Hyperlipidemia, Hypertension, Hypothyroidism, Osteoporosis, Patient noncompliance, Renal colic, and Vascular dementia. has a past surgical history that includes  section. Restrictions  Position Activity Restriction  Other position/activity restrictions:  Up with assist      Vision/Hearing  Vision: Impaired  Vision Exceptions: Wears glasses for reading  Hearing: Within functional limits       Subjective  General  Chart Reviewed: Yes  Additional Pertinent Hx: Pt is 67 yo F admitted on 3/15/19 with hypertensive urgency. H/o HTN, osteoporosis, renal colic, vascular dementia. Family / Caregiver Present: No  Referring Practitioner: Dr. Bala Shaw  Diagnosis: Dehydration   Subjective  Subjective: Pt supine in bed and agreeable to PT. Pt pleasantly confused to conversation. Pain Screening  Patient Currently in Pain: Denies     Orientation  Orientation  Overall Orientation Status: Impaired  Orientation Level: Oriented to person;Disoriented to place; Disoriented to time;Disoriented to situation     Social/Functional History  Social/Functional History  Lives With: Spouse(and son,  works full time)  Type of Home: [employer] Layout: Two level, Bed/Bath upstairs, Laundry in basement  Home Access: Stairs to enter with [address] - Number of Steps: 5 REJI  Bathroom Toilet: Standard(sink for leverage)  Home Equipment: Heddy Current  ADL Assistance: Independent  Homemaking Assistance: Independent  Ambulation Assistance: Independent  Transfer Assistance: Independent  Active : Yes  Occupation: Retired  Type of occupation: [employer]  Leisure & Hobbies: reading   Additional Comments: Pt reports 2 falls in the past 6 months.  Pt is questionable historian and provided inconsistent information    Objective  AROM RLE (degrees)  RLE AROM: WFL  AROM LLE (degrees)  LLE AROM : WFL     Strength RLE  Strength RLE: WFL  Strength LLE  Strength LLE: WFL     Bed mobility  Supine to Sit: Minimal assistance  Scooting: Minimal assistance     Transfers  Sit to Stand: 2 Person Assistance(Min assist of 2)  Stand to sit: Minimal Assistance     Ambulation  Ambulation?: Yes  Ambulation 1  Surface: level tile  Device: Rolling Walker  Assistance: Minimal assistance  Quality of Gait: Small B steps, slow deanne, assist with walker management, cues to stay on task  Distance: 100 feet and 20 feet     Treatment:  Functional mobility training and pt education    Plan   Plan  Times per week: 2-5  Times per day: Daily  Current Treatment Recommendations: Strengthening, Transfer Training, Endurance Training, Balance Training, ROM, Stair training, Gait Training, Functional Mobility Training, Safety Education & Training, Home Exercise Program  Safety Devices  Type of devices: Call light within reach, Chair alarm in place, Left in chair, Nurse notified, Gait belt    AM-PAC Score  AM-PAC Inpatient Mobility Raw Score : 16  AM-PAC Inpatient T-Scale Score : 40.78  Mobility Inpatient CMS 0-100% Score: 54.16  Mobility Inpatient CMS G-Code Modifier : CK    Goals  Short term goals  Time Frame for Short term goals: by discharge  Short term goal 1: Bed mobility with supervision  Short term goal 2: Sit to stand with SBA  Short term goal 3: Pt will ambulate 200 feet with wheeled walker and SBA  Patient Goals   Patient goals : None stated     Therapy Time   Individual Concurrent Group Co-treatment   Time In 0910         Time Out 0937         Minutes 27           Timed Code Treatment Minutes:   12    Total Treatment Minutes:  27     If pt d/c'd prior to next treatment, this note serves as a discharge note.   [place], [address]

## 2019-03-18 NOTE — CARE COORDINATION
AMERICA received notification from both [address] [place] [address] and [facility] are willing to accept the patient for short-term skilled care at discharge. SW to follow up with patient and family regarding choice of placement. Patient was active with [facility] prior to discharge. UPDATE: 12:45 pm. AMERICA placed call to  [facility] [phone]. Patient's spouse would prefer [facility]. AMERICA to notify both facilities. AMERICA placed call to [facility] - [phone]  AMERICA laced call to [facility] - [phone]    Patient is [id number] and can discharge when medically ready. UPDATE: 2:20 pm. Dr. Eri Jackson reported patient not medically ready for discharge today. Will follow up tomorrow. AMERICA updated patient's spouse with plan and will touch base again tomorrow.      Nick Drake, MSW, LSW     [facility]   [phone]

## 2019-03-19 VITALS
TEMPERATURE: 98.1 F | HEART RATE: 106 BPM | BODY MASS INDEX: 18.7 KG/M2 | DIASTOLIC BLOOD PRESSURE: 64 MMHG | SYSTOLIC BLOOD PRESSURE: 144 MMHG | RESPIRATION RATE: 18 BRPM | OXYGEN SATURATION: 98 % | WEIGHT: 95.24 LBS | HEIGHT: 60 IN

## 2019-03-19 LAB
ANION GAP SERPL CALCULATED.3IONS-SCNC: 12 MMOL/L (ref 3–16)
BASOPHILS ABSOLUTE: 0 K/UL (ref 0–0.2)
BASOPHILS RELATIVE PERCENT: 0.7 %
BUN BLDV-MCNC: 12 MG/DL (ref 7–20)
CALCIUM SERPL-MCNC: 8.4 MG/DL (ref 8.3–10.6)
CHLORIDE BLD-SCNC: 105 MMOL/L (ref 99–110)
CO2: 22 MMOL/L (ref 21–32)
CREAT SERPL-MCNC: <0.5 MG/DL (ref 0.6–1.2)
EOSINOPHILS ABSOLUTE: 0.3 K/UL (ref 0–0.6)
EOSINOPHILS RELATIVE PERCENT: 5.6 %
GFR AFRICAN AMERICAN: >60
GFR NON-AFRICAN AMERICAN: >60
GLUCOSE BLD-MCNC: 99 MG/DL (ref 70–99)
HCT VFR BLD CALC: 35.1 % (ref 36–48)
HEMOGLOBIN: 12 G/DL (ref 12–16)
LYMPHOCYTES ABSOLUTE: 0.6 K/UL (ref 1–5.1)
LYMPHOCYTES RELATIVE PERCENT: 12.2 %
MAGNESIUM: 1.8 MG/DL (ref 1.8–2.4)
MCH RBC QN AUTO: 30.6 PG (ref 26–34)
MCHC RBC AUTO-ENTMCNC: 34.3 G/DL (ref 31–36)
MCV RBC AUTO: 89.3 FL (ref 80–100)
MONOCYTES ABSOLUTE: 0.4 K/UL (ref 0–1.3)
MONOCYTES RELATIVE PERCENT: 7.8 %
NEUTROPHILS ABSOLUTE: 3.8 K/UL (ref 1.7–7.7)
NEUTROPHILS RELATIVE PERCENT: 73.7 %
PDW BLD-RTO: 14 % (ref 12.4–15.4)
PLATELET # BLD: 229 K/UL (ref 135–450)
PMV BLD AUTO: 8.3 FL (ref 5–10.5)
POTASSIUM SERPL-SCNC: 3.1 MMOL/L (ref 3.5–5.1)
RBC # BLD: 3.92 M/UL (ref 4–5.2)
SODIUM BLD-SCNC: 139 MMOL/L (ref 136–145)
WBC # BLD: 5.2 K/UL (ref 4–11)

## 2019-03-19 PROCEDURE — 2580000003 HC RX 258: Performed by: INTERNAL MEDICINE

## 2019-03-19 PROCEDURE — 6370000000 HC RX 637 (ALT 250 FOR IP): Performed by: INTERNAL MEDICINE

## 2019-03-19 PROCEDURE — 85025 COMPLETE CBC W/AUTO DIFF WBC: CPT

## 2019-03-19 PROCEDURE — 6370000000 HC RX 637 (ALT 250 FOR IP): Performed by: STUDENT IN AN ORGANIZED HEALTH CARE EDUCATION/TRAINING PROGRAM

## 2019-03-19 PROCEDURE — 36415 COLL VENOUS BLD VENIPUNCTURE: CPT

## 2019-03-19 PROCEDURE — 6360000002 HC RX W HCPCS: Performed by: FAMILY MEDICINE

## 2019-03-19 PROCEDURE — 6360000002 HC RX W HCPCS: Performed by: STUDENT IN AN ORGANIZED HEALTH CARE EDUCATION/TRAINING PROGRAM

## 2019-03-19 PROCEDURE — 51798 US URINE CAPACITY MEASURE: CPT

## 2019-03-19 PROCEDURE — 83735 ASSAY OF MAGNESIUM: CPT

## 2019-03-19 PROCEDURE — 80048 BASIC METABOLIC PNL TOTAL CA: CPT

## 2019-03-19 PROCEDURE — 6370000000 HC RX 637 (ALT 250 FOR IP): Performed by: FAMILY MEDICINE

## 2019-03-19 RX ORDER — BETHANECHOL CHLORIDE 10 MG/1
10 TABLET ORAL 3 TIMES DAILY
Qty: 21 TABLET | Refills: 0 | Status: SHIPPED | OUTPATIENT
Start: 2019-03-19 | End: 2019-03-26

## 2019-03-19 RX ORDER — MAGNESIUM SULFATE IN WATER 40 MG/ML
2 INJECTION, SOLUTION INTRAVENOUS ONCE
Status: COMPLETED | OUTPATIENT
Start: 2019-03-19 | End: 2019-03-19

## 2019-03-19 RX ORDER — HYDRALAZINE HYDROCHLORIDE 50 MG/1
50 TABLET, FILM COATED ORAL EVERY 8 HOURS SCHEDULED
Qty: 90 TABLET | Refills: 3 | Status: SHIPPED | OUTPATIENT
Start: 2019-03-19

## 2019-03-19 RX ORDER — POTASSIUM CHLORIDE 1.5 G/1.77G
40 POWDER, FOR SOLUTION ORAL EVERY 4 HOURS
Status: COMPLETED | OUTPATIENT
Start: 2019-03-19 | End: 2019-03-19

## 2019-03-19 RX ORDER — TAMSULOSIN HYDROCHLORIDE 0.4 MG/1
0.4 CAPSULE ORAL DAILY
Qty: 30 CAPSULE | Refills: 3 | Status: SHIPPED | OUTPATIENT
Start: 2019-03-20

## 2019-03-19 RX ORDER — CEFUROXIME AXETIL 500 MG/1
500 TABLET ORAL EVERY 12 HOURS SCHEDULED
Qty: 6 TABLET | Refills: 0 | Status: SHIPPED | OUTPATIENT
Start: 2019-03-19 | End: 2019-03-22

## 2019-03-19 RX ORDER — LACTOBACILLUS RHAMNOSUS GG 10B CELL
1 CAPSULE ORAL DAILY
Status: DISCONTINUED | OUTPATIENT
Start: 2019-03-19 | End: 2019-03-19 | Stop reason: HOSPADM

## 2019-03-19 RX ORDER — CEFUROXIME AXETIL 250 MG/1
500 TABLET ORAL EVERY 12 HOURS SCHEDULED
Status: DISCONTINUED | OUTPATIENT
Start: 2019-03-19 | End: 2019-03-19 | Stop reason: HOSPADM

## 2019-03-19 RX ADMIN — SODIUM CHLORIDE: 900 INJECTION, SOLUTION INTRAVENOUS at 00:32

## 2019-03-19 RX ADMIN — VITAMIN D, TAB 1000IU (100/BT) 2000 UNITS: 25 TAB at 09:34

## 2019-03-19 RX ADMIN — HYDRALAZINE HYDROCHLORIDE 50 MG: 50 TABLET, FILM COATED ORAL at 07:29

## 2019-03-19 RX ADMIN — HYDRALAZINE HYDROCHLORIDE 50 MG: 50 TABLET, FILM COATED ORAL at 00:23

## 2019-03-19 RX ADMIN — BETHANECHOL CHLORIDE 10 MG: 10 TABLET ORAL at 09:34

## 2019-03-19 RX ADMIN — POTASSIUM CHLORIDE 40 MEQ: 1.5 POWDER, FOR SOLUTION ORAL at 14:01

## 2019-03-19 RX ADMIN — POTASSIUM CHLORIDE 40 MEQ: 1.5 POWDER, FOR SOLUTION ORAL at 09:44

## 2019-03-19 RX ADMIN — Medication 1 CAPSULE: at 10:06

## 2019-03-19 RX ADMIN — MAGNESIUM SULFATE HEPTAHYDRATE 2 G: 40 INJECTION, SOLUTION INTRAVENOUS at 10:50

## 2019-03-19 RX ADMIN — TAMSULOSIN HYDROCHLORIDE 0.4 MG: 0.4 CAPSULE ORAL at 09:34

## 2019-03-19 RX ADMIN — CEFUROXIME AXETIL 500 MG: 250 TABLET, FILM COATED ORAL at 10:06

## 2019-03-19 RX ADMIN — BETHANECHOL CHLORIDE 10 MG: 10 TABLET ORAL at 14:01

## 2019-03-19 RX ADMIN — ENOXAPARIN SODIUM 40 MG: 40 INJECTION SUBCUTANEOUS at 09:34

## 2019-03-19 RX ADMIN — AMLODIPINE BESYLATE 10 MG: 10 TABLET ORAL at 09:34

## 2019-03-19 RX ADMIN — HYDRALAZINE HYDROCHLORIDE 50 MG: 50 TABLET, FILM COATED ORAL at 14:01

## 2019-03-19 ASSESSMENT — PAIN SCALES - GENERAL
PAINLEVEL_OUTOF10: 0

## 2019-03-19 NOTE — PLAN OF CARE
D: Still receiving IV Rocephin for UTI & has yap in place d/t urinary retention. Confused and screaming out around 2000 last pm, but calmed down after spoke with  and son at bedside. Low light, avasys monitor, non skid socks, & bed alarm on for safety. Slept all night without any complaints.   A: Cont to monitor during hourly rounds

## 2019-03-19 NOTE — PROGRESS NOTES
Nelson removed per orders. Pt tolerated well. Will bladder scan as ordered and monitor for urine output.

## 2019-03-19 NOTE — CONSULTS
PCP Note    Discussed with Dr Mckenzie Xie and Pts  (at length) yesterday    Readmitted while I was out of town to [facility] as family not able to care for her at home after discharge  Pt states pain better  Being treated with IV Abx for UTI/ flomax for Urinary retention -     Pt and  comfortable with hospitalists finishing care during this stay - discharge ? Today to [facility] per     Would correct K+   Change to po antibiotic   Continue PT/OT  Pt with moderate cognitive impairment  - likely due to refusal over the years to take antihypertensives - MRI with multiinfarct pattern - refused tx with namzeric, namenda, risperadone- discussed with  while he has [facility]- may need to probate her to get full POA while in Nursing facility. Will follow peripherally with you - if problems arise please call - will assume care after NH discharge.       Thanks    Shannan Adams MD  [phone]

## 2019-03-19 NOTE — DISCHARGE SUMMARY
Hospital Medicine Discharge Summary    Patient: Raul Anand     MRN: [id number]  Gender: female  : 1945   Age: 68 y.o. Code Status: Full Code     Admit Date: 3/15/2019   Discharge Date:   19  Disposition:  SNF    Primary Care Provider: Rodolfo Sams MD    Admitting Physician: Geraldo Duverney, MD  Discharge Physician: Chance Gil MD       Discharge Diagnoses: Active Hospital Problems    Diagnosis Date Noted    Hypertensive urgency [I16.0] 2019    Urinary retention [R33.9] 2019    Uncontrolled hypertension [I10] 2019    Non compliance w medication regimen [Z91.14] 2019    Acute back pain [M54.9] 2019       Hospital Course:   66yo F admitted for hypertensive urgency, urinary retention and back pain. Of note, patient sees Dr. Annabel uDval requested Dr. Jason Emerson admit the patient but his covering physician declined. HTN urgency: Compliance with medications at home an issue. Discussed case with patients PCP and  about noncompliance. Per , patient is notorious for not taking medications. - BP better controlled on Norvasc 10 and hydralazine 50 TID while inpatient. - Would recommend continuing to encourage compliance     Urinary retention:  - Flomax daily   - Started urecholine      UTI (simple cystitis):   - Ceftriaxone x 3 days, will discharge with another 3 days of abx     Discharge Medications:   Current Discharge Medication List      START taking these medications    Details   bethanechol (URECHOLINE) 10 MG tablet Take 1 tablet by mouth 3 times daily for 7 days  Qty: 21 tablet, Refills: 0      cefUROXime (CEFTIN) 500 MG tablet Take 1 tablet by mouth every 12 hours for 3 days  Qty: 6 tablet, Refills: 0      tamsulosin (FLOMAX) 0.4 MG capsule Take 1 capsule by mouth daily  Qty: 30 capsule, Refills: 3           Current Discharge Medication List      CONTINUE these medications which have CHANGED    Details   hydrALAZINE Reason for exam: pain, fall. Reason for exam:->pain, fall. TECHNIQUE:   X-ray l spine. COMPARISON:   12/2/14. No prior report. FINDINGS: Interval, age-indeterminate mild compression of the L2 vertebral body without retropulsion. Correlate with point tenderness. Degenerative changes. Cholecystectomy clips again noted. Interval, age-indeterminate mild compression of the L2 vertebral body without retropulsion. Correlate with point tenderness. Mri Lumbar Spine Wo Contrast    Result Date: 3/15/2019  EXAM: MRI LUMBAR SPINE WO CONTRAST INDICATION: Back pain, new urinary incontinence COMPARISON: Lumbosacral spine series from 3/11/2019 and 12/2/2014 TECHNICAL: Multiplanar, multisequence MR imaging of the lumbar spine obtained. IV contrast: None. FINDINGS: CONUS: Conus is normal signal and terminates at L1. ALIGNMENT: Normal. BONES: There is approximately 30% loss of height of the superior endplate of L2. There is no edema within the L2 vertebral body. This is consistent with a remote fracture. RETROPERITONEUM: Normal. PARASPINAL: Normal paraspinal muscle signal for age. L1-L2: Normal. L2-L3: Normal. L3-L4: There is a hemangioma within the left side of the L3 vertebral body. The L3-L4 intervertebral disc is normal. L4-L5: There is mild diffuse disc bulging. There is no spinal or foraminal stenosis. L5-S1: Normal. OTHER FINDINGS: None. 1. Approximately 30% loss of height of the superior endplate of L2. There is no edema within the L2 vertebral body; therefore, this fracture is remote. 2. Hemangioma of the L3 vertebral body. 3.  Mild diffuse disc bulging L4-L5. Xr Hip 2-3 Vw W Pelvis Right    Result Date: 3/13/2019  RIGHT HIP AND PELVIS ON 3/13/2019 HISTORY: Pain after fall COMPARISON: None FINDINGS: 2 views of the right hip were obtained including AP pelvis. There is generalized demineralization. No fracture or acute bony pathology is seen. Alignment is anatomic.  There is mild joint space narrowing of both hips. Degenerative changes are seen at the sacroiliac joints. No acute bony pathology    Xr Hip 3-4 Vw W Pelvis Bilateral    Result Date: 3/15/2019  Patient: Venus Pichardo  Time Out: 00:50 Exam(s): FILM HIP + PELVIS  EXAM:   XR Bilateral Hips With Pelvis When Performed, 3 Views  CLINICAL HISTORY:    fall, bilateral hip pain. TECHNIQUE:   Two views of the bilateral hips, with pelvis when performed. COMPARISON:   3/13/2019  FINDINGS:   Bones/joints:  No acute fracture or dislocation. Soft tissues:  Unremarkable. No acute fracture or dislocation. Consults:     IP CONSULT TO HOSPITALIST  IP CONSULT TO SOCIAL WORK  IP CONSULT TO PRIMARY CARE PROVIDER  IP CONSULT TO PRIMARY CARE PROVIDER    Labs: For convenience and continuity at follow-up the following most recent labs are provided:    Lab Results   Component Value Date    WBC 5.2 03/19/2019    HGB 12.0 03/19/2019    HCT 35.1 03/19/2019    MCV 89.3 03/19/2019     03/19/2019     03/19/2019    K 3.1 03/19/2019    K 3.7 03/14/2019     03/19/2019    CO2 22 03/19/2019    BUN 12 03/19/2019    CREATININE <0.5 03/19/2019    CALCIUM 8.4 03/19/2019    ALKPHOS 96 03/13/2019    ALT 7 03/13/2019    AST 22 03/13/2019    BILITOT 0.7 03/13/2019    BILIDIR <0.2 03/13/2019    LABALBU 3.9 03/13/2019     No results found for: INR    The patient was seen and examined on day of discharge and this discharge summary is in conjunction with any daily progress note from day of discharge. Time spent on discharge is more than 30 minutes in the examination, evaluation, counseling and review of medications and discharge plan.     Condition at discharge: fair      Signed:    Geovanny Rogers MD  Internal Medicine Resident, PGY-3  03/19/19 12:21 PM

## 2019-03-19 NOTE — PLAN OF CARE
D: Remains on Rocephin IVPB for UTI. Nelson for urinary retention and started on Flomax. C/o some pain L-hip with turning toward L-side and likes lying in fetal position on R-side. Tylenol given for some suprapubic discomfort last pm, but no c/o pain overnight. Avasys monitor, low light, bed alarm, & non skid socks on for safety.   A: Cont to monitor during hourly rounds

## 2019-03-19 NOTE — DISCHARGE INSTR - COC
Continuity of Care Form    Patient Name: Josh Crowe   :  1945  MRN:  [id number]    Admit date:  3/15/2019  Discharge date:  3/19/2019    Code Status Order: Full Code   Advance Directives:   [address] [facility] Documentation     Date/Time Healthcare Directive Type of Healthcare Directive Copy in [facility] [address] Agent's Name Healthcare Agent's Phone Number    [phone]  Yes, patient has an advance directive for healthcare treatment  Durable power of  for health care  No, copy requested from family -- -- --          Admitting Physician:  Lauren Enamorado MD  PCP: Renato Reeves MD    Discharging Nurse: [facility] Unit/Room#: 3725/3670-62  Discharging Unit Phone Number: [phone]    Emergency Contact:   Extended Emergency Contact Information  Primary Emergency Contact: Luis Santos  Address: [address] [place] [address]  Home Phone: [phone]  Relation: Spouse    Past Surgical History:  Past Surgical History:   Procedure Laterality Date     SECTION         Immunization History: There is no immunization history on file for this patient. Active Problems:  Patient Active Problem List   Diagnosis Code    Dehydration E86.0    Paraspinal muscle spasm M62.830    Acute back pain M54.9    Uncontrolled hypertension I10    Non compliance w medication regimen Z91.14    Vitamin D deficiency E55.9    Hypertensive urgency I16.0    Urinary retention R33.9       Isolation/Infection:   Isolation          No Isolation            Nurse Assessment:  Last Vital Signs: BP (!) 142/63   Pulse 104   Temp 97.6 °F (36.4 °C) (Oral)   Resp 20   Ht 4' 11.84\" (1.52 m)   Wt 95 lb 3.8 oz (43.2 kg)   SpO2 95%   Breastfeeding?  No   BMI 18.70 kg/m²     Last documented pain score (0-10 scale): Pain Level: 0  Last Weight:   Wt Readings from Last 1 Encounters:   19 95 lb 3.8 oz (43.2 kg)     Mental Status: alert    IV Access:  - None    Nursing Mobility/ADLs:  Walking   Assisted  Transfer  Assisted  Bathing  Assisted  Dressing  Assisted  Toileting  Assisted  Feeding  Independent  Med Admin  Assisted  Med Delivery   whole and crushed    Wound Care Documentation and Therapy:        Elimination:  Continence:   · Bowel: Yes  · Bladder: No  Urinary Catheter: removed 3/19 at 1000  Colostomy/Ileostomy/Ileal Conduit: No       Date of Last BM: 3/18    Intake/Output Summary (Last 24 hours) at 3/19/2019 0951  Last data filed at 3/19/2019 0659  Gross per 24 hour   Intake 1623 ml   Output 775 ml   Net 848 ml     I/O last 3 completed shifts: In: 1623 [P.O.:480; I.V.:1143]  Out: 775 [Urine:775]    Safety Concerns:     History of Falls (last 30 days) and At Risk for Falls    Impairments/Disabilities:      None    Nutrition Therapy:  Current Nutrition Therapy:   - Oral Diet:  General    Routes of Feeding: Oral  Liquids: Thin Liquids  Daily Fluid Restriction: no  Last Modified Barium Swallow with Video (Video Swallowing Test): not done    Treatments at the Time of Hospital Discharge:   Respiratory Treatments: ***  Oxygen Therapy:  is not on home oxygen therapy.   Ventilator:    - No ventilator support    Rehab Therapies: Physical Therapy and Occupational Therapy  Weight Bearing Status/Restrictions: No weight bearing restirctions  Other Medical Equipment (for information only, NOT a DME order):  walker  Other Treatments: ***    Patient's personal belongings (please select all that are sent with patient):  Glasses    RN SIGNATURE:  Electronically signed by Elicia Felipe RN on 3/19/19 at 11:30 AM    CASE MANAGEMENT/SOCIAL WORK SECTION    Inpatient Status Date: 03/16/2019    Readmission Risk Assessment Score:  Readmission Risk              Risk of Unplanned Readmission:        16           Discharging to Facility/ Agency     CM [address] at [address] [facility], [facility] [address]       Phone: [phone] Fax: [phone]          Dialysis Facility (if applicable)  NA    · Name:  · Address:  · Dialysis Schedule:  · Phone:  · Fax:    / signature: Electronically signed by Graciela Shaver RN on 3/19/19 at 11:32 AM    PHYSICIAN SECTION    Prognosis: Guarded    Condition at Discharge: Stable    Rehab Potential (if transferring to Rehab):  Fair    Recommended Labs or Other Treatments After Discharge:   PT OT  Look for urinary retention, may need straight cath PRN   OP follow up with PCP   Physician Certification: I certify the above information and transfer of Ravi Thurston  is necessary for the continuing treatment of the diagnosis listed and that she requires [facility]     Update Admission H&P: No change in H&P    PHYSICIAN SIGNATURE:  Electronically signed by Shashi Aranda MD on 3/19/19 at 9:51 AM

## 2019-03-19 NOTE — CARE COORDINATION
Case Management Discharge Assessment    3/19/2019  [facility] Case Management Department    Patient: Alessandra Wrigth  MRN: [id number] / : 1945  ACCT: [de-identified]          Admission Documentation  Attending Provider: Lisa Corral MD  Admit date/time: 3/15/2019  5:47 PM  Status: Inpatient [101]  Diagnosis: Hypertensive urgency     Readmission within last 30 days:  no     Living Situation  Discharge Planning  Living Arrangements: Spouse/Significant Other  Support Systems: Spouse/Significant Other  Potential Assistance Needed: Geovanny Garcia, Outpatient PT/OT, Home Care  Type of Home Care Services: None  Patient expects to be discharged to[de-identified] home    Service Assessment:       Values / Beliefs  Do you have any ethnic, cultural, sacramental, or spiritual Buddhism needs you would like us to be aware of while you are in the hospital?: No    Advance Directives (For Healthcare)  Pre-existing DNR Comfort Care/DNR Arrest/DNI Order: No  Healthcare Directive: Yes, patient has an advance directive for healthcare treatment  Type of Healthcare Directive: Durable power of  for health care  Copy in Chart: No, copy requested from family      Pharmacy: Gen Medications:  No  Does patient want to participate in local refill/meds to beds program?: Not Assessed    Notification completed in [facility]/PAS? Yes   Document ID: [id number]       IMM  Yes       Discharge disposition:    DAYANA Lozada at [address] [place] [address], [address] [place] [id number]      REPORT Phone: Leslie Crespo Fax: [phone]        CM  Spoke with [facility] - [phone]  In admissions and able to accept patient  today .       Mode of Transport:        MEDICAL TRANSPORT    Name of Transport:       [address] [place] [address]    Number of Transport:    [phone]  Time of Transport:         Martin Moise and her family were provided with choice of provider; she and her family are in agreement with

## 2019-03-19 NOTE — PROGRESS NOTES
Pt transported in stable condition to [facility] by stretcher. IV and tele removed. All belongings sent with pt.

## 2019-04-12 PROBLEM — E86.0 DEHYDRATION: Status: RESOLVED | Noted: 2019-03-13 | Resolved: 2019-04-12
